# Patient Record
Sex: FEMALE | Race: BLACK OR AFRICAN AMERICAN | NOT HISPANIC OR LATINO | Employment: FULL TIME | URBAN - METROPOLITAN AREA
[De-identification: names, ages, dates, MRNs, and addresses within clinical notes are randomized per-mention and may not be internally consistent; named-entity substitution may affect disease eponyms.]

---

## 2020-07-23 ENCOUNTER — OFFICE VISIT (OUTPATIENT)
Dept: INTERNAL MEDICINE CLINIC | Facility: CLINIC | Age: 35
End: 2020-07-23

## 2020-07-23 VITALS
WEIGHT: 195.77 LBS | DIASTOLIC BLOOD PRESSURE: 84 MMHG | HEIGHT: 64 IN | HEART RATE: 76 BPM | OXYGEN SATURATION: 98 % | TEMPERATURE: 97.3 F | BODY MASS INDEX: 33.42 KG/M2 | SYSTOLIC BLOOD PRESSURE: 140 MMHG

## 2020-07-23 DIAGNOSIS — Z01.419 ROUTINE GYNECOLOGICAL EXAMINATION: ICD-10-CM

## 2020-07-23 DIAGNOSIS — E66.9 OBESITY (BMI 30.0-34.9): ICD-10-CM

## 2020-07-23 DIAGNOSIS — Z13.0 SCREENING FOR DEFICIENCY ANEMIA: ICD-10-CM

## 2020-07-23 DIAGNOSIS — Z11.4 SCREENING FOR HIV (HUMAN IMMUNODEFICIENCY VIRUS): ICD-10-CM

## 2020-07-23 DIAGNOSIS — Z13.1 SCREENING FOR DIABETES MELLITUS: ICD-10-CM

## 2020-07-23 DIAGNOSIS — Z13.220 SCREENING, LIPID: ICD-10-CM

## 2020-07-23 DIAGNOSIS — L63.9 ALOPECIA AREATA: ICD-10-CM

## 2020-07-23 DIAGNOSIS — R03.0 ELEVATED BP WITHOUT DIAGNOSIS OF HYPERTENSION: ICD-10-CM

## 2020-07-23 DIAGNOSIS — N89.8 VAGINAL ODOR: ICD-10-CM

## 2020-07-23 DIAGNOSIS — Z00.00 ROUTINE ADULT HEALTH MAINTENANCE: Primary | ICD-10-CM

## 2020-07-23 PROBLEM — E66.811 OBESITY (BMI 30.0-34.9): Status: ACTIVE | Noted: 2020-07-23

## 2020-07-23 PROCEDURE — 99385 PREV VISIT NEW AGE 18-39: CPT | Performed by: PHYSICIAN ASSISTANT

## 2020-07-23 NOTE — PROGRESS NOTES
Assessment/Plan:      Diagnoses and all orders for this visit:    Routine adult health maintenance    Obesity (BMI 30 0-34 9)  -     CBC and differential; Future  -     Comprehensive metabolic panel; Future  -     Lipid panel; Future  -     TSH, 3rd generation with Free T4 reflex; Future    Alopecia areata  -     Ambulatory referral to Dermatology; Future  -     CBC and differential; Future  -     TSH, 3rd generation with Free T4 reflex; Future  -     SHUBHAM Screen w/ Reflex to Titer/Pattern; Future  -     RF Screen w/ Reflex to Titer; Future    Vaginal odor    Elevated BP without diagnosis of hypertension    Routine gynecological examination  -     Ambulatory referral to Obstetrics / Gynecology; Future    Screening for HIV (human immunodeficiency virus)  -     HIV 1/2 Antigen/Antibody (4th Generation) w Reflex [facility]; Future    Screening for deficiency anemia  -     CBC and differential; Future    Screening for diabetes mellitus  -     Comprehensive metabolic panel; Future    Screening, lipid  -     Lipid panel; Future      patient is a very pleasant 78-year-old female presenting today to establish with our office and for annual physical     She overall states that she is generally healthy and denies any pre-existing medical conditions or any significant past medical history  Concerns addressed today:    BMI/obesity:  BMI currently 33 6  Patient states that next week she will start a strict diet plan which was reviewed with patient in detail  Patient was advised low carb and low-fat/low calorie foods, small portions as well as avoiding sugary drinks and fast food  She was encouraged to more fruits and vegetables as well as lean protein  Exercise regimen was also discussed with patient and I emphasized importance of short bursts of high intensity full body training, patient can refer to you to or download applications on her phone as well as consider home weight training and cardio as tolerated    I motivated patient to lose 10 lb before next checkup in 4 months  Alopecia areata:  Patient has noted a bald spot on the top of her head for which she is very self-conscious for at least the past 10 years  There does not appear to be any hair breakage in she denies any possible here damage from heat, chemicals or coloring  She states that she wears a wig on a daily basis to cover the spot and her boyfriend of 10 years has not even seen her without a weight on  Will check basic autoimmune blood work to rule out underlying autoimmune condition  Will refer to dermatology to further assist with treatment options  Patient also noted a vaginal order for the past few weeks without any obvious abnormal discharge or vaginal or urinary symptoms  Patient will need to see gyn for this  She will also need updated Pap testing and possible STD screening for GC and chlamydia  Referral provided  Patient has mildly elevated blood pressure of 140/84  Patient states she checks her BP occasionally at work and states it was always okay  Will need to keep an eye on this and certainly weight loss will help  Discussed importance of limiting salt and caffeine in her diet  Once again referrals provided for dermatology in gyn  Routine blood work ordered including HIV screen, CBC and CMP, lipid, TSH as well as SHUBHAM and rheumatoid factor  We will call her with these results  Age-appropriate education regarding screenings and prevention were addressed with patient as above  She declines Tdap today but states she may be interested at next visit  Will ask her again when she returns for follow-up in 4 months for weight check and BP recheck  Chief Complaint   Patient presents with   [facility] Establish Care     Routine labs with std       Chief Complaint   Patient presents with   [facility] Establish Care     Routine labs with std       Subjective:     Patient ID: Mohinder Berry is a 38 y o  female      34y/o female here today for annual physical and establishment with our office  Pt reports she has been fairly healthy, does not know of any pertinent medical hx  She denies any current medical conditions, does not take medication for anything  Denies any recent illnesses or hospitalizations  States she had sexual intercourse a month ago and started with a vaginal odor and that's not normal for her  States noticing off and on now for past month  States hesitant to have sex because of the odor  Intermittent discharge when she wipes herself  - creamy white/clear  Denies vaginal pain or itching, denies pain with intercourse  Denies discolored or thick clumpy discharge  Denies urinary sxs  She also notes a bald spot on top of head and wants to see dermatologist  States she is very embarrassed by it and wears a wig most of the time, has dated her boyfriend now for 10 years and states he has never seen her without her wig  Area is not itchy or painful  States tried an African American brand oil and states it has helped to fill in other areas but spot on top of head remains completely without hair  She is not aware of any personal or FHx of autoimmune conditions or thyroid conditions  Last dental cleaning: approx   Last eye exam: no correction  She states she does have portion control but is going to start a diet next week  States she will cut out red meats, eat more chicken, limit fried foods  She does eat fruits and vegetables daily  She will try to exercise every day for 1 hour with weights and walking  She does not take any vitamins  Denies ETOH, tobacco or use of drugs  She is currently in monogamous relationship with boyfriend x 10 years    She does not have a GYN, last PAP testing unknown  Periods are regular monthly  Last about 5 days, heavy bleeding first 2-3 days with clots, then lightens up  She works as HR  full time  Rents apartment, smoke alarms and CO detectors UTD  Wears seatbelt in car   Lives with daughter, feels safe at home and in relationship  Patient reports her mother is healthy and goes to the doctor on a regular basis  She reports diabetes in her grandfather and lung cancer in her grandmother who was a smoker  Review of Systems   Constitutional: Negative  HENT: Negative  Eyes: Negative  Respiratory: Negative  Cardiovascular: Negative  Gastrointestinal: Negative  Genitourinary:        As in HPI   Skin:        As in HPI   Neurological: Negative  Psychiatric/Behavioral: Negative  The following portions of the patient's history were reviewed and updated as appropriate: allergies, current medications, past family history, past medical history, past social history, past surgical history and problem list       Objective:     Physical Exam   Constitutional: She is oriented to person, place, and time  She appears well-developed  No distress  Obesity BMI 33 60   HENT:   Head: Normocephalic  Right Ear: Hearing, tympanic membrane and ear canal normal    Left Ear: Hearing, tympanic membrane and ear canal normal    Nose: Nose normal    Mouth/Throat: Oropharynx is clear and moist and mucous membranes are normal  Normal dentition  Eyes: Pupils are equal, round, and reactive to light  Conjunctivae, EOM and lids are normal    Neck: Normal range of motion and phonation normal  Neck supple  Normal carotid pulses present  Carotid bruit is not present  No thyroid mass present  Cardiovascular: Normal rate, regular rhythm and normal heart sounds  Pulses:       Carotid pulses are 2+ on the right side, and 2+ on the left side  Radial pulses are 2+ on the right side, and 2+ on the left side  Posterior tibial pulses are 2+ on the right side, and 2+ on the left side  Pulmonary/Chest: Effort normal and breath sounds normal    Abdominal: Soft  Bowel sounds are normal  There is no tenderness     Obese abdomen   Genitourinary:   Genitourinary Comments: Pt needs PAP, vaginosis/vaginitis swab and GC/chlamydoa screening - deferred to GYN  Musculoskeletal:   Gross normal appearance and movement of extremities and spine without obvious pain or limitation   Lymphadenopathy:        Head (right side): No submandibular and no tonsillar adenopathy present  Head (left side): No submandibular and no tonsillar adenopathy present  Neurological: She is alert and oriented to person, place, and time  Skin: Skin is intact  Alopecia areata noted top of head, with clearing of at least 2 inches in diameter, though difficult to fully assess as pt has hair in simon over the affected area  There does not seem to be hair breakage within this area  There is no redness or rash  Psychiatric: She has a normal mood and affect  Her speech is normal and behavior is normal    Vitals reviewed  Vitals:    07/23/20 0909   BP: 140/84   BP Location: Left arm   Patient Position: Sitting   Cuff Size: Standard   Pulse: 76   Temp: (!) 97 3 °F (36 3 °C)   TempSrc: Temporal   SpO2: 98%   Weight: 88 8 kg (195 lb 12 3 oz)   Height: 5' 4" (1 626 m)       BMI Counseling: Body mass index is 33 6 kg/m²  The BMI is above normal  Nutrition recommendations include reducing portion sizes, decreasing overall calorie intake, 3-5 servings of fruits/vegetables daily, consuming healthier snacks, moderation in carbohydrate intake, increasing intake of lean protein, reducing intake of saturated fat and trans fat and reducing intake of cholesterol  Exercise recommendations include moderate aerobic physical activity for 150 minutes/week       PHQ-9 Depression Screening    PHQ-9:    Frequency of the following problems over the past two weeks:       Little interest or pleasure in doing things:  0 - not at all  Feeling down, depressed, or hopeless:  0 - not at all  PHQ-2 Score:  0

## 2020-08-07 ENCOUNTER — OFFICE VISIT (OUTPATIENT)
Dept: OBGYN CLINIC | Facility: CLINIC | Age: 35
End: 2020-08-07
Payer: COMMERCIAL

## 2020-08-07 VITALS
BODY MASS INDEX: 33.63 KG/M2 | TEMPERATURE: 98.2 F | DIASTOLIC BLOOD PRESSURE: 74 MMHG | WEIGHT: 197 LBS | SYSTOLIC BLOOD PRESSURE: 112 MMHG | HEIGHT: 64 IN

## 2020-08-07 DIAGNOSIS — Z01.411 ENCOUNTER FOR GYNECOLOGICAL EXAMINATION WITH ABNORMAL FINDING: Primary | ICD-10-CM

## 2020-08-07 DIAGNOSIS — Z11.3 SCREEN FOR STD (SEXUALLY TRANSMITTED DISEASE): ICD-10-CM

## 2020-08-07 DIAGNOSIS — N89.8 VAGINAL ODOR: ICD-10-CM

## 2020-08-07 PROCEDURE — 3008F BODY MASS INDEX DOCD: CPT | Performed by: PHYSICIAN ASSISTANT

## 2020-08-07 PROCEDURE — 1036F TOBACCO NON-USER: CPT | Performed by: PHYSICIAN ASSISTANT

## 2020-08-07 PROCEDURE — 99385 PREV VISIT NEW AGE 18-39: CPT | Performed by: PHYSICIAN ASSISTANT

## 2020-08-07 NOTE — PATIENT INSTRUCTIONS
Go for blood work  Start monthly self-breast exam   Use hypoallergenic body wash and laundry detergent  Do not sit for prolonged periods in wet or sweaty clothing  Avoid douches

## 2020-08-07 NOTE — PROGRESS NOTES
Assessment/Plan:    No problem-specific Assessment & Plan notes found for this encounter  Diagnoses and all orders for this visit:    Encounter for gynecological examination with abnormal finding  -     Thinprep Pap and HR HPV DNA    Screen for STD (sexually transmitted disease)  -     RPR; Future  -     Hepatitis panel, acute; Future  -     Sureswab(R), Vaginosis/Vaginitis Plus    Vaginal odor  -     Sureswab(R), Vaginosis/Vaginitis Plus        Pap and vaginal cultures done  Orders entered for syphilis and hepatitis screening; HIV order already in Epic  We will call with results  Suspect BV infection  Recommended patient to use hypoallergenic body wash and laundry detergent  Do not sit for prolonged periods in wet or sweaty clothing  Avoid douches  Educated patient on proper timing and demonstrated proper technique for self-breast exam   If no problems, patient to return in 1 year for routine gyn care  Subjective:      Patient ID: Paula Meyers is a 38 y o  female  Patient is a  here for yearly gyn exam   She is new to our office today  It has been several years since her last gyn visit  States she is doing well overall  Periods are regular once a month, and bleeding lasts for 4-7 days  She states the first day is heavy; denies cramping  Has some bloating with her cycle  Complains of vaginal odor for the last several months  Possible increase in discharge  Denies itching and burning  Patient is sexually active with a monogamous partner; requests STD screening today  Does not desire contraception at this time  She denies bowel/bladder changes, pelvic pain, abdominal pain, n/v, change in appetite, and thyroid disease  No history of abnormal Paps or HPV  Patient does not know how to perform self-breast exam   Denies new masses, skin changes, nipple discharge, and pain/tenderness        The following portions of the patient's history were reviewed and updated as appropriate: allergies, current medications, past family history, past medical history, past social history, past surgical history and problem list     Review of Systems   Constitutional: Negative for appetite change and unexpected weight change  Cardiovascular:        No masses, skin changes, nipple discharge, and pain/tenderness  Gastrointestinal: Negative for abdominal distention, abdominal pain, constipation, diarrhea, nausea and vomiting  Genitourinary: Positive for vaginal discharge (With odor)  Negative for difficulty urinating, dysuria, frequency, genital sores, hematuria, menstrual problem, pelvic pain, urgency, vaginal bleeding and vaginal pain  Objective:      /74   Temp 98 2 °F (36 8 °C)   Ht 5' 4" (1 626 m)   Wt 89 4 kg (197 lb)   LMP 07/29/2020 (Exact Date)   BMI 33 81 kg/m²          Physical Exam   Constitutional: She is oriented to person, place, and time  She appears well-developed  She is cooperative  Neck: Neck supple  No thyromegaly present  Pulmonary/Chest: Effort normal  Right breast exhibits no inverted nipple, no mass, no nipple discharge, no skin change and no tenderness  Left breast exhibits no inverted nipple, no mass, no nipple discharge, no skin change and no tenderness  Breasts are symmetrical    Abdominal: Soft  Normal appearance  She exhibits no distension  There is no abdominal tenderness  Genitourinary:    Vulva, cervix, right adnexa and left adnexa normal    There is no rash, tenderness, lesion or injury on the right labia  There is no rash, tenderness, lesion or injury on the left labia  Right adnexum displays no mass, no tenderness and no fullness  Left adnexum displays no mass, no tenderness and no fullness  Vaginal discharge (Moderate, white, thick) present  No vaginal erythema, tenderness or bleeding  No erythema, tenderness or bleeding in the vagina  Lymphadenopathy:     She has no cervical adenopathy   No inguinal adenopathy noted on the right or left side  Right: No supraclavicular adenopathy present  Left: No supraclavicular adenopathy present  Neurological: She is alert and oriented to person, place, and time  Skin: Skin is warm and dry  Psychiatric: Her behavior is normal  Mood, judgment and thought content normal    Vitals reviewed

## 2020-08-11 LAB
CLINICAL INFO: ABNORMAL
CYTO CVX: ABNORMAL
DATE PREVIOUS BX: ABNORMAL
GEN CATEG CVX/VAG CYTO-IMP: ABNORMAL
HPV I/H RISK 1 DNA CVX QL PROBE+SIG AMP: NOT DETECTED
LMP START DATE: ABNORMAL
SL AMB PREV. PAP:: ABNORMAL
SPECIMEN SOURCE CVX/VAG CYTO: ABNORMAL

## 2020-08-14 LAB
A VAGINAE DNA VAG NAA+PROBE-LOG#: 7.5 LOG (CELLS/ML)
C GLABRATA DNA VAG QL NAA+PROBE: NOT DETECTED
C TRACH RRNA SPEC QL NAA+PROBE: NOT DETECTED
CANDIDA DNA VAG QL NAA+PROBE: NOT DETECTED
G VAGINALIS DNA VAG NAA+PROBE-LOG#: >8 LOG (CELLS/ML)
LACTOBACILLUS DNA VAG NAA+PROBE-LOG#: NOT DETECTED LOG CELLS/ML
MEGASPHAERA SP DNA VAG NAA+PROBE-LOG#: >8 LOG (CELLS/ML)
N GONORRHOEA RRNA SPEC QL NAA+PROBE: NOT DETECTED
SL AMB BV CATEGORY:: ABNORMAL
SL AMB C. PARAPSILOSIS, DNA: NOT DETECTED
SL AMB C. TROPICALIS, DNA: NOT DETECTED
T VAGINALIS RRNA SPEC QL NAA+PROBE: NOT DETECTED

## 2020-08-18 ENCOUNTER — TELEPHONE (OUTPATIENT)
Dept: OBGYN CLINIC | Facility: CLINIC | Age: 35
End: 2020-08-18

## 2020-08-18 DIAGNOSIS — B96.89 BV (BACTERIAL VAGINOSIS): Primary | ICD-10-CM

## 2020-08-18 DIAGNOSIS — N76.0 BV (BACTERIAL VAGINOSIS): Primary | ICD-10-CM

## 2020-08-18 RX ORDER — METRONIDAZOLE 500 MG/1
500 TABLET ORAL EVERY 12 HOURS SCHEDULED
Qty: 10 TABLET | Refills: 0 | Status: SHIPPED | OUTPATIENT
Start: 2020-08-18 | End: 2020-08-23

## 2020-08-18 NOTE — TELEPHONE ENCOUNTER
Spoke with patient regarding Pap and culture results  Pap was ASCUS with negative HPV  GC/chlamydia negative; positive for BV  Suspect chronic BV could be causing abnormal cells  Rx for metronidazole 500 mg BID x 5 days sent to pharmacy  Instructed patient to avoid alcohol while on abx  Call if symptoms do not resolve  F/u in 1 year for repeat Pap

## 2020-08-25 ENCOUNTER — TELEPHONE (OUTPATIENT)
Dept: OBGYN CLINIC | Facility: CLINIC | Age: 35
End: 2020-08-25

## 2020-08-25 NOTE — TELEPHONE ENCOUNTER
Patient called with further questions regarding Pap - ASCUS with negative HPV  Result most likely due to concurrent BV infection  Patient picked up abx today  Counseled patient to avoid alcohol while on abx  Call with further problems

## 2020-09-15 ENCOUNTER — OFFICE VISIT (OUTPATIENT)
Dept: OBGYN CLINIC | Facility: CLINIC | Age: 35
End: 2020-09-15
Payer: COMMERCIAL

## 2020-09-15 VITALS
HEIGHT: 64 IN | DIASTOLIC BLOOD PRESSURE: 84 MMHG | SYSTOLIC BLOOD PRESSURE: 120 MMHG | WEIGHT: 199.4 LBS | BODY MASS INDEX: 34.04 KG/M2 | TEMPERATURE: 99.2 F

## 2020-09-15 DIAGNOSIS — N76.0 ACUTE VAGINITIS: Primary | ICD-10-CM

## 2020-09-15 PROCEDURE — 1036F TOBACCO NON-USER: CPT | Performed by: PHYSICIAN ASSISTANT

## 2020-09-15 PROCEDURE — 99214 OFFICE O/P EST MOD 30 MIN: CPT | Performed by: PHYSICIAN ASSISTANT

## 2020-09-15 NOTE — PROGRESS NOTES
Assessment/Plan:    No problem-specific Assessment & Plan notes found for this encounter  Diagnoses and all orders for this visit:    Acute vaginitis  -     NuSwab Vaginitis (VG)        Vaginal cultures done; we will call with results  Suspect yeast infection  Recommended patient to start OTC Monistat vaginal cream 7 day for treatment  Discussed possible causes of yeast   Patient to call if symptoms worsen or change  Subjective:      Patient ID: Franc Rose is a 38 y o  female  Patient is here with complaint of probable vaginal infection  Seen with her boyfriend present  States symptoms started on 9/13 with vulvar swelling and irritation  She continues to have vulvovaginal discomfort, itching, and discharge  Patient denies urinary symptoms, odor, vaginal bleeding, pelvic pain, abdominal pain, n/v, and fever/chills  She is sexually active with a monogamous partner  She was treated at the end of August for BV; finished full course of abx  GC/chlamydia screening on 8/7 was negative  The following portions of the patient's history were reviewed and updated as appropriate: allergies, current medications, past family history, past medical history, past social history, past surgical history and problem list     Review of Systems   Constitutional: Negative for chills and fever  Gastrointestinal: Negative for abdominal distention, abdominal pain, nausea and vomiting  Genitourinary: Positive for vaginal discharge (With itching)  Negative for difficulty urinating, dysuria, frequency, genital sores, hematuria, menstrual problem, pelvic pain, urgency, vaginal bleeding and vaginal pain  Vulvovaginal discomfort  Objective:      /84   Temp 99 2 °F (37 3 °C)   Ht 5' 4" (1 626 m)   Wt 90 4 kg (199 lb 6 4 oz)   LMP 09/02/2020   BMI 34 23 kg/m²          Physical Exam  Vitals signs reviewed  Exam conducted with a chaperone present     Constitutional:       Appearance: Normal appearance  She is well-developed  Genitourinary:     General: Normal vulva  Pubic Area: No rash  Labia:         Right: No rash, tenderness, lesion or injury  Left: No rash, tenderness, lesion or injury  Vagina: Vaginal discharge (Moderate, white, curd-like), erythema and tenderness present  No bleeding  Cervix: Normal       Uterus: Normal        Adnexa: Right adnexa normal and left adnexa normal         Right: No mass, tenderness or fullness  Left: No mass, tenderness or fullness  Lymphadenopathy:      Lower Body: No right inguinal adenopathy  No left inguinal adenopathy  Skin:     General: Skin is warm and dry  Neurological:      Mental Status: She is alert and oriented to person, place, and time  Psychiatric:         Mood and Affect: Mood normal          Behavior: Behavior normal  Behavior is cooperative  Thought Content:  Thought content normal          Judgment: Judgment normal

## 2020-09-18 ENCOUNTER — TELEPHONE (OUTPATIENT)
Dept: OBGYN CLINIC | Facility: CLINIC | Age: 35
End: 2020-09-18

## 2020-09-18 LAB
A VAGINAE DNA VAG QL NAA+PROBE: ABNORMAL SCORE
BVAB2 DNA VAG QL NAA+PROBE: ABNORMAL SCORE
C ALBICANS DNA VAG QL NAA+PROBE: POSITIVE
C GLABRATA DNA VAG QL NAA+PROBE: NEGATIVE
MEGA1 DNA VAG QL NAA+PROBE: ABNORMAL SCORE
T VAGINALIS RRNA SPEC QL NAA+PROBE: NEGATIVE

## 2020-09-18 NOTE — TELEPHONE ENCOUNTER
Spoke with patient regarding vaginal culture results - positive for yeast   Patient to use OTC Monistat 7 day vaginal cream   Call if symptoms do not resolve

## 2020-11-23 ENCOUNTER — TELEPHONE (OUTPATIENT)
Dept: INTERNAL MEDICINE CLINIC | Facility: CLINIC | Age: 35
End: 2020-11-23

## 2020-12-11 ENCOUNTER — TELEPHONE (OUTPATIENT)
Dept: OBGYN CLINIC | Facility: CLINIC | Age: 35
End: 2020-12-11

## 2020-12-11 DIAGNOSIS — N89.8 VAGINAL ODOR: Primary | ICD-10-CM

## 2020-12-11 RX ORDER — METRONIDAZOLE 500 MG/1
500 TABLET ORAL EVERY 12 HOURS SCHEDULED
Qty: 10 TABLET | Refills: 0 | Status: SHIPPED | OUTPATIENT
Start: 2020-12-11 | End: 2020-12-16

## 2021-02-04 ENCOUNTER — TELEPHONE (OUTPATIENT)
Dept: OBGYN CLINIC | Facility: CLINIC | Age: 36
End: 2021-02-04

## 2021-02-05 ENCOUNTER — OFFICE VISIT (OUTPATIENT)
Dept: OBGYN CLINIC | Facility: CLINIC | Age: 36
End: 2021-02-05

## 2021-02-05 VITALS — WEIGHT: 207 LBS | DIASTOLIC BLOOD PRESSURE: 84 MMHG | SYSTOLIC BLOOD PRESSURE: 122 MMHG | BODY MASS INDEX: 35.53 KG/M2

## 2021-02-05 DIAGNOSIS — N89.8 VAGINAL ODOR: Primary | ICD-10-CM

## 2021-02-05 PROCEDURE — 87070 CULTURE OTHR SPECIMN AEROBIC: CPT | Performed by: PHYSICIAN ASSISTANT

## 2021-02-05 PROCEDURE — 87147 CULTURE TYPE IMMUNOLOGIC: CPT | Performed by: PHYSICIAN ASSISTANT

## 2021-02-05 PROCEDURE — 87077 CULTURE AEROBIC IDENTIFY: CPT | Performed by: PHYSICIAN ASSISTANT

## 2021-02-05 PROCEDURE — 99214 OFFICE O/P EST MOD 30 MIN: CPT | Performed by: PHYSICIAN ASSISTANT

## 2021-02-05 RX ORDER — CLINDAMYCIN PHOSPHATE 20 MG/G
1 CREAM VAGINAL
Qty: 40 G | Refills: 0 | Status: SHIPPED | OUTPATIENT
Start: 2021-02-05 | End: 2021-02-12

## 2021-02-05 NOTE — PROGRESS NOTES
Assessment/Plan:    No problem-specific Assessment & Plan notes found for this encounter  Diagnoses and all orders for this visit:    Vaginal odor  -     clindamycin (CLEOCIN) 2 % vaginal cream; Insert 1 applicator into the vagina daily at bedtime for 7 days  -     Genital Comprehensive Culture        Vaginal culture done  We will call with results  Rx for Cleocin 2% vaginal cream daily x 7 days sent to pharmacy  Patient can try daily probiotic  Make sure to use hypoallergenic soap and laundry detergent  Call if symptoms worsen or change  Subjective:      Patient ID: Sallie Hadley is a 38 y o  female  Patient is here with complaint of recurrent vaginal infection  Was diagnosed with BV in August and treated with metronidazole  Then developed a yeast infection  Felt she had another BV infection in mid-December; treated again with metronidazole  Today, she complains of vaginal odor for the last 2 weeks  Patient denies urinary symptoms, discharge, itching, burning, pelvic pain, abdominal pain, n/v, and fever/chills  No new sexual partners  Does not want treated with oral antibiotic as they cause her yeast infections  The following portions of the patient's history were reviewed and updated as appropriate: allergies, current medications, past family history, past medical history, past social history, past surgical history and problem list     Review of Systems   Constitutional: Negative for chills and fever  Gastrointestinal: Negative for abdominal distention, abdominal pain, nausea and vomiting  Genitourinary: Negative for difficulty urinating, dysuria, frequency, genital sores, hematuria, menstrual problem, pelvic pain, urgency, vaginal bleeding, vaginal discharge and vaginal pain  Vaginal odor         Objective:      /84   Wt 93 9 kg (207 lb)   BMI 35 53 kg/m²          Physical Exam  Vitals signs reviewed  Exam conducted with a chaperone present     Constitutional: Appearance: Normal appearance  She is well-developed  Genitourinary:     General: Normal vulva  Pubic Area: No rash  Labia:         Right: No rash, tenderness, lesion or injury  Left: No rash, tenderness, lesion or injury  Vagina: Normal  No vaginal discharge, erythema, tenderness or bleeding  Cervix: Normal       Uterus: Normal        Adnexa: Right adnexa normal and left adnexa normal         Right: No mass, tenderness or fullness  Left: No mass, tenderness or fullness  Lymphadenopathy:      Lower Body: No right inguinal adenopathy  No left inguinal adenopathy  Skin:     General: Skin is warm and dry  Neurological:      Mental Status: She is alert and oriented to person, place, and time  Psychiatric:         Mood and Affect: Mood normal          Behavior: Behavior normal  Behavior is cooperative  Thought Content:  Thought content normal          Judgment: Judgment normal

## 2021-02-08 LAB
BACTERIA GENITAL AEROBE CULT: ABNORMAL

## 2021-02-09 ENCOUNTER — TELEPHONE (OUTPATIENT)
Dept: OBGYN CLINIC | Facility: CLINIC | Age: 36
End: 2021-02-09

## 2021-02-09 NOTE — TELEPHONE ENCOUNTER
Spoke with patient regarding vaginal culture results - positive for BV  Patient to finish full 7 days of clindamycin cream   Call if symptoms do not resolve

## 2021-06-08 DIAGNOSIS — B96.89 BV (BACTERIAL VAGINOSIS): Primary | ICD-10-CM

## 2021-06-08 DIAGNOSIS — N76.0 BV (BACTERIAL VAGINOSIS): Primary | ICD-10-CM

## 2021-06-08 RX ORDER — CLINDAMYCIN PHOSPHATE 20 MG/G
1 CREAM VAGINAL
Qty: 40 G | Refills: 0 | Status: SHIPPED | OUTPATIENT
Start: 2021-06-08 | End: 2021-06-15

## 2021-07-30 ENCOUNTER — TELEPHONE (OUTPATIENT)
Dept: OBGYN CLINIC | Facility: CLINIC | Age: 36
End: 2021-07-30

## 2021-07-30 NOTE — TELEPHONE ENCOUNTER
Patient requesting refill of medication for BV, also has questions as to why she is having recurrence monthly of same symptoms of vaginal infection

## 2021-08-06 ENCOUNTER — OFFICE VISIT (OUTPATIENT)
Dept: OBGYN CLINIC | Facility: CLINIC | Age: 36
End: 2021-08-06

## 2021-08-06 VITALS
DIASTOLIC BLOOD PRESSURE: 82 MMHG | WEIGHT: 199.6 LBS | BODY MASS INDEX: 34.08 KG/M2 | SYSTOLIC BLOOD PRESSURE: 120 MMHG | HEIGHT: 64 IN

## 2021-08-06 DIAGNOSIS — N76.0 RECURRENT VAGINITIS: Primary | ICD-10-CM

## 2021-08-06 PROCEDURE — 99214 OFFICE O/P EST MOD 30 MIN: CPT | Performed by: PHYSICIAN ASSISTANT

## 2021-08-06 PROCEDURE — 87660 TRICHOMONAS VAGIN DIR PROBE: CPT | Performed by: PHYSICIAN ASSISTANT

## 2021-08-06 PROCEDURE — 87510 GARDNER VAG DNA DIR PROBE: CPT | Performed by: PHYSICIAN ASSISTANT

## 2021-08-06 PROCEDURE — 87480 CANDIDA DNA DIR PROBE: CPT | Performed by: PHYSICIAN ASSISTANT

## 2021-08-06 RX ORDER — CLINDAMYCIN PHOSPHATE 20 MG/G
1 CREAM VAGINAL
Qty: 40 G | Refills: 0 | Status: SHIPPED | OUTPATIENT
Start: 2021-08-06 | End: 2021-08-13

## 2021-08-06 NOTE — PROGRESS NOTES
Assessment/Plan:    No problem-specific Assessment & Plan notes found for this encounter  Diagnoses and all orders for this visit:    Recurrent vaginitis  -     VAGINOSIS DNA PROBE (AFFIRM)  -     clindamycin (CLEOCIN) 2 % vaginal cream; Insert 1 applicator into the vagina daily at bedtime for 7 days        Vaginal cultures done  We will call with results  Rx for Cleocin 2% vaginal cream daily x 7 days sent to pharmacy  Call if symptoms worsen or change  Recommended switching to unscented laundry detergent and dryer sheets  F/u in 2-3 weeks for yearly gyn exam     Subjective:      Patient ID: Paulo Sahni is a 43 y o  female  Patient is here with complaint of recurrent BV  Was last treated in February 2021  States she started having odor about a week ago  Denies urinary symptoms, vaginal discharge, itching, burning, pelvic pain, abdominal pain, n/v, and fever/chills  She is sexually active with a monogamous partner; they do not use condoms  Uses Dove soap and Gain scented laundry detergent and dryer sheets  Does not use vaginal douches or washes  The following portions of the patient's history were reviewed and updated as appropriate: allergies, current medications, past family history, past medical history, past social history, past surgical history and problem list     Review of Systems   Constitutional: Negative for chills and fever  Gastrointestinal: Negative for abdominal distention, abdominal pain, nausea and vomiting  Genitourinary: Negative for difficulty urinating, dysuria, frequency, genital sores, hematuria, menstrual problem, pelvic pain, urgency, vaginal bleeding, vaginal discharge and vaginal pain  Vaginal odor         Objective:      /82 (BP Location: Left arm, Patient Position: Sitting, Cuff Size: Standard)   Ht 5' 4" (1 626 m)   Wt 90 5 kg (199 lb 9 6 oz)   LMP 08/01/2021   BMI 34 26 kg/m²          Physical Exam  Vitals reviewed   Exam conducted with a chaperone present  Constitutional:       Appearance: Normal appearance  She is well-developed  Genitourinary:     General: Normal vulva  Pubic Area: No rash  Labia:         Right: No rash, tenderness, lesion or injury  Left: No rash, tenderness, lesion or injury  Vagina: Vaginal discharge (mild, clear, watery) present  No erythema, tenderness or bleeding  Cervix: Normal       Uterus: Normal        Adnexa: Right adnexa normal and left adnexa normal         Right: No mass, tenderness or fullness  Left: No mass, tenderness or fullness  Lymphadenopathy:      Lower Body: No right inguinal adenopathy  No left inguinal adenopathy  Skin:     General: Skin is warm and dry  Neurological:      Mental Status: She is alert and oriented to person, place, and time  Psychiatric:         Mood and Affect: Mood normal          Behavior: Behavior normal  Behavior is cooperative  Thought Content:  Thought content normal          Judgment: Judgment normal

## 2021-08-06 NOTE — PATIENT INSTRUCTIONS
Rx for Cleocin 2% vaginal cream daily x 7 days sent to pharmacy  Switch to unscented laundry detergent and dryer sheets  Call if symptoms worsen or change

## 2021-08-08 LAB
CANDIDA RRNA VAG QL PROBE: NEGATIVE
G VAGINALIS RRNA GENITAL QL PROBE: POSITIVE
T VAGINALIS RRNA GENITAL QL PROBE: NEGATIVE

## 2021-08-10 ENCOUNTER — TELEPHONE (OUTPATIENT)
Dept: OBGYN CLINIC | Facility: CLINIC | Age: 36
End: 2021-08-10

## 2021-08-10 NOTE — TELEPHONE ENCOUNTER
Patient aware of culture results per Dr Carl Cruz, was prescribed cleocin at [facility] w/ [facility]  Picked up from pharmacy today and will start  She still has questions as to why she continues to have recurrent infections monthly  Patient aware provider is out of the office this week

## 2021-08-16 ENCOUNTER — TELEPHONE (OUTPATIENT)
Dept: OBGYN CLINIC | Facility: CLINIC | Age: 36
End: 2021-08-16

## 2021-08-16 NOTE — TELEPHONE ENCOUNTER
Spoke with patient regarding results - positive for BV  Started Cleocin cream last week  Patient states she gets these infections after intercourse  Recommended avoiding anything scented - body wash, laundry detergent, dryer sheets, etc   Can try daily probiotic  Avoid tight clothing and spending prolonged periods in wet or sweaty clothes  Can refer to vaginitis clinic if it continues  Call with further problems

## 2021-10-26 ENCOUNTER — ANNUAL EXAM (OUTPATIENT)
Dept: OBGYN CLINIC | Facility: CLINIC | Age: 36
End: 2021-10-26

## 2021-10-26 VITALS
SYSTOLIC BLOOD PRESSURE: 122 MMHG | BODY MASS INDEX: 35.17 KG/M2 | HEIGHT: 64 IN | WEIGHT: 206 LBS | DIASTOLIC BLOOD PRESSURE: 80 MMHG

## 2021-10-26 DIAGNOSIS — Z01.419 ENCOUNTER FOR GYNECOLOGICAL EXAMINATION WITHOUT ABNORMAL FINDING: Primary | ICD-10-CM

## 2021-10-26 PROCEDURE — G0124 SCREEN C/V THIN LAYER BY MD: HCPCS | Performed by: PATHOLOGY

## 2021-10-26 PROCEDURE — G0476 HPV COMBO ASSAY CA SCREEN: HCPCS | Performed by: PHYSICIAN ASSISTANT

## 2021-10-26 PROCEDURE — G0145 SCR C/V CYTO,THINLAYER,RESCR: HCPCS | Performed by: PATHOLOGY

## 2021-10-26 PROCEDURE — 99395 PREV VISIT EST AGE 18-39: CPT | Performed by: PHYSICIAN ASSISTANT

## 2021-10-29 LAB
HPV HR 12 DNA CVX QL NAA+PROBE: NEGATIVE
HPV16 DNA CVX QL NAA+PROBE: NEGATIVE
HPV18 DNA CVX QL NAA+PROBE: NEGATIVE

## 2021-11-02 LAB
LAB AP GYN PRIMARY INTERPRETATION: ABNORMAL
Lab: ABNORMAL
PATH INTERP SPEC-IMP: ABNORMAL

## 2021-11-04 ENCOUNTER — TELEPHONE (OUTPATIENT)
Dept: OBGYN CLINIC | Facility: CLINIC | Age: 36
End: 2021-11-04

## 2023-02-03 ENCOUNTER — OFFICE VISIT (OUTPATIENT)
Dept: FAMILY MEDICINE CLINIC | Facility: CLINIC | Age: 38
End: 2023-02-03

## 2023-02-03 VITALS
RESPIRATION RATE: 18 BRPM | HEIGHT: 65 IN | SYSTOLIC BLOOD PRESSURE: 142 MMHG | OXYGEN SATURATION: 98 % | WEIGHT: 208 LBS | HEART RATE: 76 BPM | TEMPERATURE: 97.8 F | BODY MASS INDEX: 34.66 KG/M2 | DIASTOLIC BLOOD PRESSURE: 92 MMHG

## 2023-02-03 DIAGNOSIS — L63.9 ALOPECIA AREATA: Primary | ICD-10-CM

## 2023-02-03 DIAGNOSIS — R03.0 ELEVATED BLOOD PRESSURE READING: ICD-10-CM

## 2023-02-03 DIAGNOSIS — E66.9 OBESITY (BMI 30.0-34.9): ICD-10-CM

## 2023-02-03 PROBLEM — R87.610 ATYPICAL SQUAMOUS CELLS OF UNDETERMINED SIGNIFICANCE (ASCUS) ON PAPANICOLAOU SMEAR OF CERVIX: Status: ACTIVE | Noted: 2023-02-03

## 2023-02-03 NOTE — PROGRESS NOTES
Name: Enma Jessica      : 1985      MRN: [id number]  Encounter Provider: Doc Echols DO  Encounter Date: 2/3/2023   [place] [facility] [place]essment & Plan     1  Alopecia areata  Assessment & Plan:  - ongoing for several years with worsening sxs over the last several months  - has never tried any medications or seen anyone for this  - referral to derm for further eval/treatment    Orders:  -     Ambulatory Referral to Dermatology; Future    2  Elevated blood pressure reading  Assessment & Plan:  - /92, repeat 142/90  - no need for medication at this time  - encourage lifestyle modifications  - info regarding DASH diet provided       3  Obesity (BMI 30 0-34 9)  -     Lipid panel; Future  -     Hemoglobin A1C; Future  -     Basic metabolic panel; Future         Subjective      41 y/o F presents for new patient evaluation  Her main concern is her alopecia which has been on going for years  It was small but now the center of her head is completely bald  Does not think anyone has this in the family  She is very self conscious about this and wears a wig on a daily basis  BP elevated in office, no hx of HTN and denies FHx of HTN  No formal exercise but will working at [employer] on  [address] which allow her to get some physical activity  Cough and cold symptoms a couple weeks ago - still cough with mild mucus production that's improving  Last pap was 10/26/2021, ASCUS but HPV negative but pt instructed to follow up with her OB regarding this  Review of Systems   Constitutional: Negative for chills and fever  HENT: Positive for congestion  Negative for rhinorrhea  Eyes: Negative for visual disturbance  Respiratory: Positive for cough  Negative for shortness of breath  Cardiovascular: Negative for chest pain and palpitations     Gastrointestinal: Negative for abdominal pain, constipation, diarrhea, nausea and vomiting  Genitourinary: Negative for dysuria  Musculoskeletal: Negative for arthralgias  Skin: Negative for rash  Hair loss   Neurological: Negative for dizziness, light-headedness and headaches  No current outpatient medications on file prior to visit  Objective     /92   Pulse 76   Temp 97 8 °F (36 6 °C)   Resp 18   Ht 5' 4 8" (1 646 m)   Wt 94 3 kg (208 lb)   SpO2 98%   BMI 34 83 kg/m²     Physical Exam  Vitals reviewed  Constitutional:       General: She is not in acute distress  Appearance: Normal appearance  HENT:      Head: Normocephalic and atraumatic  Right Ear: External ear normal       Left Ear: External ear normal       Nose: Nose normal       Mouth/Throat:      Pharynx: Oropharynx is clear  Eyes:      Conjunctiva/sclera: Conjunctivae normal    Cardiovascular:      Rate and Rhythm: Normal rate and regular rhythm  Pulses: Normal pulses  Heart sounds: Normal heart sounds  Pulmonary:      Effort: Pulmonary effort is normal       Breath sounds: Normal breath sounds  Abdominal:      Palpations: Abdomen is soft  Musculoskeletal:      Cervical back: Neck supple  Right lower leg: No edema  Left lower leg: No edema  Skin:     General: Skin is warm  Capillary Refill: Capillary refill takes less than 2 seconds  Comments: Multiple patchy areas of hair loss on scalp - 2 inches in diameter but difficult to assess completely as patient has simon over area of hair loss  No erythema or rash present  Neurological:      Mental Status: She is alert and oriented to person, place, and time  Gait: Gait normal    Psychiatric:         Mood and Affect: Mood normal          Behavior: Behavior normal          Thought Content:  Thought content normal          Judgment: Judgment normal        Luz Guzman DO

## 2023-02-04 NOTE — ASSESSMENT & PLAN NOTE
- ongoing for several years with worsening sxs over the last several months  - has never tried any medications or seen anyone for this  - referral to derm for further eval/treatment

## 2023-02-04 NOTE — ASSESSMENT & PLAN NOTE
- per last pap smear 10/2021 - this was suspected to be related to her chronic BV infection but she was recommended to get f/u pap in 1 year at that time  - HPV was negative at that time  - pt does not recall these results - reviewed results again with pt and instructed her to f/u with her OBGYN for follow up test

## 2023-02-04 NOTE — ASSESSMENT & PLAN NOTE
- /92, repeat 142/90  - no need for medication at this time  - encourage lifestyle modifications  - info regarding DASH diet provided

## 2023-02-09 ENCOUNTER — ANNUAL EXAM (OUTPATIENT)
Dept: OBGYN CLINIC | Facility: CLINIC | Age: 38
End: 2023-02-09

## 2023-02-09 VITALS
BODY MASS INDEX: 34.32 KG/M2 | SYSTOLIC BLOOD PRESSURE: 126 MMHG | DIASTOLIC BLOOD PRESSURE: 72 MMHG | WEIGHT: 206 LBS | HEIGHT: 65 IN

## 2023-02-09 DIAGNOSIS — R87.610 ASCUS OF CERVIX WITH NEGATIVE HIGH RISK HPV: ICD-10-CM

## 2023-02-09 DIAGNOSIS — Z01.419 ENCOUNTER FOR GYNECOLOGICAL EXAMINATION WITHOUT ABNORMAL FINDING: Primary | ICD-10-CM

## 2023-02-09 DIAGNOSIS — Z12.31 ENCOUNTER FOR SCREENING MAMMOGRAM FOR MALIGNANT NEOPLASM OF BREAST: ICD-10-CM

## 2023-02-09 DIAGNOSIS — Z01.419 PAP SMEAR, AS PART OF ROUTINE GYNECOLOGICAL EXAMINATION: ICD-10-CM

## 2023-02-09 RX ORDER — AMOXICILLIN 500 MG/1
CAPSULE ORAL
COMMUNITY
Start: 2023-01-19

## 2023-02-09 RX ORDER — IBUPROFEN 800 MG/1
TABLET ORAL
COMMUNITY
Start: 2023-01-19

## 2023-02-09 NOTE — PROGRESS NOTES
Assessment/Plan:    No problem-specific Assessment & Plan notes found for this encounter  Diagnoses and all orders for this visit:    Encounter for gynecological examination without abnormal finding  -     Cancel: Liquid-based pap, screening  -     Liquid-based pap, screening    Pap smear, as part of routine gynecological examination    Encounter for screening mammogram for malignant neoplasm of breast  -     Mammo screening bilateral w 3d & cad; Future    ASCUS of cervix with negative high risk HPV    Other orders  -     amoxicillin (AMOXIL) 500 mg capsule; TAKE ONE CAPLET BY MOUTH THREE TIMES DAILY (Patient not taking: Reported on 2/9/2023)  -     ibuprofen (MOTRIN) 800 mg tablet; TAKE ONE TABLET BY MOUTH EVERY SIX HOURS AS NEEDED PAIN (Patient not taking: Reported on 2/9/2023)          Normal gynecological physical examination  Self-breast examination stressed  Mammogram ordered  Discussed regular exercise, healthy diet, importance of vitamin D and calcium supplements  Discussed importance of sun block use during periods of prolonged sun exposure  Patient will be seen in 1 year for routine gynecologic and medical examination  Patient will call office for any problems, concerns, or issues which may arise during the interim  Pap smear with HPV testing done today to follow-up on abnormal pap smear showing ASCUS with negative HPV from October 2021  Subjective:       Raz Painter is a 41-year-old female with no significant PMH presenting to the office today for an annual well visit  She has no specific complaints today  She had ASCUS with negative HPV on her last pap smear results, so we will repeat pap smear with HPV testing today  She gets occasional headaches for which she takes ibuprofen or acetaminophen  She denies fever, chills, chest pain, SOB, abdominal pain, pelvic pain       HPI    Patient ID: Raz Painter is a 42 y o  female who presents today for her annual gynecologic and medical examination    Menstrual status: Periods are regular  Patient denies abnormal discharge, pelvic pain, or itching  Vasomotor symptoms: none    Patient reports normal appetite    Patient reports normal bowel and bladder habits    Patient denies any significant pelvic or abdominal pain    Patient denies any headaches, chest pain, shortness of breath fever shakes or chills    Patient denies any COVID 19 symptoms including cough or loss of taste or smell    COVID vaccine status: No covid vaccinations    Medical problems: No significant PMH    Colonoscopy status: N/A    Mammogram status: Patient will schedule for her first mammogram this year after she turns 34years old  The following portions of the patient's history were reviewed and updated as appropriate: allergies, current medications, past family history, past medical history, past social history, past surgical history and problem list     Review of Systems   Constitutional: Negative  Negative for appetite change, diaphoresis, fatigue, fever and unexpected weight change  HENT: Negative  Eyes: Negative  Respiratory: Negative  Negative for shortness of breath  Cardiovascular: Negative  Negative for chest pain  Gastrointestinal: Negative  Negative for abdominal pain, blood in stool, constipation, diarrhea, nausea and vomiting  Endocrine: Negative  Negative for cold intolerance and heat intolerance  Genitourinary: Negative  Negative for dysuria, frequency, hematuria, urgency, vaginal bleeding, vaginal discharge and vaginal pain  Musculoskeletal: Negative  Skin: Negative  Allergic/Immunologic: Negative  Neurological: Negative  Hematological: Negative  Negative for adenopathy  Psychiatric/Behavioral: Negative            Objective:      /72   Ht 5' 4 8" (1 646 m)   Wt 93 4 kg (206 lb)   LMP 02/03/2023 (Exact Date)   BMI 34 49 kg/m²          Physical Exam  Constitutional:       General: She is not in acute distress  Appearance: Normal appearance  She is well-developed  She is not diaphoretic  HENT:      Head: Normocephalic and atraumatic  Eyes:      Pupils: Pupils are equal, round, and reactive to light  Cardiovascular:      Rate and Rhythm: Normal rate and regular rhythm  Heart sounds: Normal heart sounds  No murmur heard  No friction rub  No gallop  Pulmonary:      Effort: Pulmonary effort is normal       Breath sounds: Normal breath sounds  Chest:   Breasts:     Breasts are symmetrical       Right: No inverted nipple, mass, nipple discharge, skin change or tenderness  Left: No inverted nipple, mass, nipple discharge, skin change or tenderness  Abdominal:      General: Bowel sounds are normal       Palpations: Abdomen is soft  Genitourinary:     Exam position: Supine  Labia:         Right: No rash or lesion  Left: No rash or lesion  Urethra: No urethral swelling or urethral lesion  Vagina: Normal  No vaginal discharge, erythema, tenderness or bleeding  Cervix: No discharge or friability  Uterus: Normal  Not enlarged and not tender  Adnexa: Right adnexa normal and left adnexa normal         Right: No mass, tenderness or fullness  Left: No mass, tenderness or fullness  Rectum: Normal  Guaiac result negative  Comments: Good pelvic support  Musculoskeletal:         General: Normal range of motion  Cervical back: Normal range of motion and neck supple  Lymphadenopathy:      Cervical: No cervical adenopathy  Upper Body:      Right upper body: No supraclavicular adenopathy  Left upper body: No supraclavicular adenopathy  Skin:     General: Skin is warm and dry  Findings: No rash  Neurological:      General: No focal deficit present  Mental Status: She is alert and oriented to person, place, and time     Psychiatric:         Mood and Affect: Mood normal          Speech: Speech normal          Behavior: Behavior normal          Thought Content:  Thought content normal          Judgment: Judgment normal

## 2023-02-16 LAB
LAB AP GYN PRIMARY INTERPRETATION: NORMAL
Lab: NORMAL

## 2023-03-13 ENCOUNTER — TELEPHONE (OUTPATIENT)
Dept: OBGYN CLINIC | Facility: CLINIC | Age: 38
End: 2023-03-13

## 2023-03-13 NOTE — TELEPHONE ENCOUNTER
Please tell patient that since it has been a while since she was last treated, that perhaps we should do a culture just to make sure that she does not need anything else treated as well      Please tell her I will be glad to see her this afternoon    Thank

## 2023-03-13 NOTE — TELEPHONE ENCOUNTER
Pt  Is calling because she says she has a BV  She is requesting the (clindamycin) vaginal cream to be sent to [facility] in [address]  She says she should be able to get a prescription since she last got one from [facility]  I told the pt  I will send the message to the provider but that an appointment might be needed        [phone]

## 2023-03-14 ENCOUNTER — OFFICE VISIT (OUTPATIENT)
Dept: OBGYN CLINIC | Facility: CLINIC | Age: 38
End: 2023-03-14

## 2023-03-14 VITALS
BODY MASS INDEX: 35.02 KG/M2 | SYSTOLIC BLOOD PRESSURE: 126 MMHG | HEIGHT: 65 IN | WEIGHT: 210.2 LBS | DIASTOLIC BLOOD PRESSURE: 62 MMHG

## 2023-03-14 DIAGNOSIS — N89.8 VAGINAL ODOR: ICD-10-CM

## 2023-03-14 DIAGNOSIS — N89.8 VAGINAL DISCHARGE: ICD-10-CM

## 2023-03-14 DIAGNOSIS — N76.0 RECURRENT VAGINITIS: Primary | ICD-10-CM

## 2023-03-14 RX ORDER — CLINDAMYCIN PHOSPHATE 20 MG/G
1 CREAM VAGINAL
Qty: 40 G | Refills: 0 | Status: SHIPPED | OUTPATIENT
Start: 2023-03-14

## 2023-03-14 NOTE — PROGRESS NOTES
Assessment/Plan:    No problem-specific Assessment & Plan notes found for this encounter  Diagnoses and all orders for this visit:    Recurrent vaginitis  -     Molecular Vaginal Panel  -     clindamycin (CLEOCIN) 2 % vaginal cream; Insert 1 applicator into the vagina daily at bedtime    Vaginal odor    Vaginal discharge        Subjective:      Patient ID: Delfino Drew is a 42 y o  female  Patient presents for evaluation of vaginal discharge and odor  Reports the discharge is white and is sometimes thick, sometimes watery in consistency  Notes odor particularly after having sex  Feels similar to past BV infections which resolved after use of clindamycin cream  Denies any abnormal vaginal bleeding, fever, chills, N/V, bowel changes, dysuria, hematuria, or changes to urinary frequency  She is in a monogamous relationship with a male partner, reports having unprotected sex  Denies history of STIs  LMP on 3/2/23, periods remain regular with no changes to amount of bleeding/length of cycle  Total time of today's visit was 25 minutes of which greater than 50% was spent face-to-face counseling the patient as well as coordination of care, review of chart lab values, physical examination with cultures as well as computer entry into the [facility] medical record system  The following portions of the patient's history were reviewed and updated as appropriate: allergies, current medications, past family history, past medical history, past social history, past surgical history and problem list     Review of Systems   Constitutional: Negative for chills and fever  Gastrointestinal: Negative for abdominal pain, blood in stool, constipation, diarrhea, nausea and vomiting  Genitourinary: Positive for vaginal discharge  Negative for dysuria, frequency, hematuria, menstrual problem and vaginal bleeding           Objective:      /62   Ht 5' 4 8" (1 646 m)   Wt 95 3 kg (210 lb 3 2 oz)   LMP 03/02/2023 (Exact Date)   BMI 35 20 kg/m²          Physical Exam  Exam conducted with a chaperone present  Constitutional:       General: She is not in acute distress  Cardiovascular:      Rate and Rhythm: Normal rate  Pulmonary:      Effort: Pulmonary effort is normal  No respiratory distress  Genitourinary:     General: Normal vulva  Exam position: Lithotomy position  Labia:         Right: No rash, tenderness or lesion  Left: No rash, tenderness or lesion  Urethra: No urethral swelling or urethral lesion  Vagina: Vaginal discharge present  No erythema, bleeding or lesions  Cervix: No lesion or erythema  Uterus: Normal  Not enlarged and not tender  Adnexa: Right adnexa normal and left adnexa normal         Right: No mass, tenderness or fullness  Left: No mass, tenderness or fullness  Comments: Vaginal discharge noted, appropriate culture obtained  Skin:     General: Skin is warm and dry  Neurological:      General: No focal deficit present  Mental Status: She is alert  Mental status is at baseline     Psychiatric:         Mood and Affect: Mood normal          Behavior: Behavior normal

## 2023-03-14 NOTE — PATIENT INSTRUCTIONS
Topics: vaginal discharge    Appropriate cultures obtained at today's visit  Placed order for clindamycin cream  Will follow up on culture results  Patient advised to call the office for any worsening symptoms or questions/concerns

## 2023-03-15 ENCOUNTER — TELEPHONE (OUTPATIENT)
Dept: OBGYN CLINIC | Facility: CLINIC | Age: 38
End: 2023-03-15

## 2023-03-15 LAB
C GLABRATA DNA VAG QL NAA+PROBE: NEGATIVE
C KRUSEI DNA VAG QL NAA+PROBE: NEGATIVE
CANDIDA SP 6 PNL VAG NAA+PROBE: NEGATIVE
T VAGINALIS DNA VAG QL NAA+PROBE: NEGATIVE
VAGINOSIS/ITIS DNA PNL VAG PROBE+SIG AMP: POSITIVE

## 2023-03-15 NOTE — TELEPHONE ENCOUNTER
----- Message from Callie Wolf MD sent at 3/15/2023  1:02 PM EDT -----  Culture from yesterday was positive for BV    Please have patient complete the medication clindamycin cream that was sent to the pharmacy yesterday for treatment    Thanks

## 2023-05-04 ENCOUNTER — HOSPITAL ENCOUNTER (OUTPATIENT)
Dept: RADIOLOGY | Age: 38
Discharge: HOME | End: 2023-05-04

## 2023-05-04 VITALS — HEIGHT: 65 IN | WEIGHT: 210 LBS | BODY MASS INDEX: 34.99 KG/M2

## 2023-05-04 DIAGNOSIS — Z12.31 ENCOUNTER FOR SCREENING MAMMOGRAM FOR MALIGNANT NEOPLASM OF BREAST: ICD-10-CM

## 2023-06-19 NOTE — PATIENT INSTRUCTIONS
Rx for Cleocin 2% vaginal cream daily x 7 days sent to pharmacy  Try daily probiotic  Make sure to use hypoallergenic body wash and laundry detergent  Call if symptoms worsen or change 
Patient Specific Counseling (Will Not Stick From Patient To Patient): The patient presents for anticipated excision. Appears to have resolved, clinically, following the biopsy. Discuss the option of topical treatment. Recommend adjuvant imiquimod instead of excision.
Detail Level: Detailed

## 2023-09-07 ENCOUNTER — OFFICE VISIT (OUTPATIENT)
Dept: OBGYN CLINIC | Facility: CLINIC | Age: 38
End: 2023-09-07
Payer: COMMERCIAL

## 2023-09-07 VITALS
BODY MASS INDEX: 34.82 KG/M2 | WEIGHT: 209 LBS | DIASTOLIC BLOOD PRESSURE: 72 MMHG | SYSTOLIC BLOOD PRESSURE: 128 MMHG | HEIGHT: 65 IN

## 2023-09-07 DIAGNOSIS — N92.0 MENORRHAGIA WITH REGULAR CYCLE: ICD-10-CM

## 2023-09-07 DIAGNOSIS — R10.2 PELVIC PAIN: ICD-10-CM

## 2023-09-07 DIAGNOSIS — N94.6 DYSMENORRHEA: ICD-10-CM

## 2023-09-07 DIAGNOSIS — D25.9 UTERINE LEIOMYOMA, UNSPECIFIED LOCATION: Primary | ICD-10-CM

## 2023-09-07 PROCEDURE — 99214 OFFICE O/P EST MOD 30 MIN: CPT | Performed by: OBSTETRICS & GYNECOLOGY

## 2023-09-07 NOTE — PATIENT INSTRUCTIONS
Topic: Large uterine fibroid and pelvic pain and heavy periods    We discussed the large uterine fibroid and clinical symptoms associated with it. We will obtain a follow up U/S to assess for an endometrial stripe and right ovary. We will schedule for an endometrial biopsy in the near future. We will then have a discussion for treatment planning. Patient told to call for any issues or problems which may arise for her.

## 2023-09-07 NOTE — PROGRESS NOTES
Assessment/Plan:    No problem-specific Assessment & Plan notes found for this encounter. Diagnoses and all orders for this visit:    Uterine leiomyoma, unspecified location    Menorrhagia with regular cycle    Dysmenorrhea    Pelvic pain      Uterine leiomyoma  - Patient is scheduled to receive an ultrasound      Subjective:      Patient ID: Song Merrill is a 34 y.o. female presenting today to follow up after discovery of a uterine leiomyoma in the ER on 09/04/2023. Song Merrill is a 34 y.o. female presenting today with heavy menstrual bleeding and abdominal discomfort. She presented at the ER on 09/04/2023 due to significant abdominal discomfort where ultrasound examination revealed a large uterine fibroid. Today she followed up to discuss the next steps in care. LMP was one month ago but she notes they can be irregular. She confirms lower abdomen pain, urinary and bowel frequency, bloating, and heavy menstrual periods. She denies spotting, loss of appetite, chest pain, SOB, fevers and headaches. She states she use OTC pain medications for her abdominal pain which she says slightly improves the pain. At this time we will obtain a follow up ultrasound to try and evaluate the right ovary as well as the endometrial stripe. We will also plan to schedule an endometrial biopsy in the near future. When all results were obtained, we will sit down and discuss the best treatment planning for her and her clinical condition. All questions were answered for the patient at today's visit and     Patient knows to call for any questions, issues, problems or concerns which arise for her. Total time of today's visit was 25 minutes of which greater than 50% was spent face-to-face counseling the patient as well as coordination of care, review of chart and lab values, physical examination as well as computer entry into the [facility] medical record system.         The following portions of the patient's history were reviewed and updated as appropriate: allergies, current medications, past family history, past medical history, past social history, past surgical history and problem list.    Review of Systems   Constitutional: Negative. Negative for appetite change, diaphoresis, fatigue, fever and unexpected weight change. HENT: Negative. Eyes: Negative. Respiratory: Negative. Cardiovascular: Negative. Gastrointestinal: Negative. Negative for abdominal pain, blood in stool, constipation, diarrhea, nausea and vomiting. Patient notes bowel frequency similar to urinary frequency. Endocrine: Negative. Negative for cold intolerance and heat intolerance. Genitourinary: Positive for frequency. Negative for dysuria, hematuria, urgency, vaginal bleeding, vaginal discharge and vaginal pain. Patient is noting significant urinary frequency   Musculoskeletal: Negative. Skin: Negative. Allergic/Immunologic: Negative. Neurological: Negative. Hematological: Negative. Negative for adenopathy. Psychiatric/Behavioral: Negative. Objective:      /72   Ht 5' 4.8" (1.646 m)   Wt 94.8 kg (209 lb)   LMP 08/21/2023 (Approximate)   BMI 34.99 kg/m²          Physical Exam  Constitutional:       Appearance: She is well-developed. HENT:      Head: Normocephalic. Eyes:      Pupils: Pupils are equal, round, and reactive to light. Cardiovascular:      Rate and Rhythm: Normal rate. Pulmonary:      Effort: Pulmonary effort is normal.   Abdominal:      Palpations: Abdomen is soft. Genitourinary:     Labia:         Right: No rash, tenderness, lesion or injury. Left: No rash, tenderness, lesion or injury. Urethra: No urethral swelling or urethral lesion. Vagina: Normal.      Cervix: Normal.      Uterus: Enlarged (12-14 week size uterine fibroid) and tender. Adnexa:         Right: No mass, tenderness or fullness.           Left: No mass, tenderness or fullness. Musculoskeletal:         General: Normal range of motion. Cervical back: Normal range of motion and neck supple. Skin:     General: Skin is warm and dry. Neurological:      General: No focal deficit present. Mental Status: She is alert and oriented to person, place, and time. Psychiatric:         Mood and Affect: Mood normal.         Behavior: Behavior normal.         Thought Content:  Thought content normal.         Judgment: Judgment normal.

## 2023-09-11 ENCOUNTER — ULTRASOUND (OUTPATIENT)
Dept: OBGYN CLINIC | Facility: CLINIC | Age: 38
End: 2023-09-11
Payer: COMMERCIAL

## 2023-09-11 DIAGNOSIS — D25.9 UTERINE LEIOMYOMA, UNSPECIFIED LOCATION: Primary | ICD-10-CM

## 2023-09-11 DIAGNOSIS — R10.2 PELVIC PAIN: ICD-10-CM

## 2023-09-11 PROCEDURE — 76856 US EXAM PELVIC COMPLETE: CPT | Performed by: OBSTETRICS & GYNECOLOGY

## 2023-09-11 NOTE — PROGRESS NOTES
AMB US Pelvic Non OB    Date/Time: 9/11/2023 9:15 AM    Performed by: Ej Horton  Authorized by: Edward Carlin MD    Procedure details:     Indications: leiomyoma and non-obstetric abdominal pain      Technique:  US Pelvic, Non-OB with complete exam  Uterine findings:     Length (cm): 15.9    Height (cm):  10.6    Width (cm):  7.1    Uterine adhesions: not identified      Adnexal mass: not identified      Polyps: not identified      Myomas: identified      Endometrial stripe: identified      Endometrial hyperplasia: not identified      Endometrium thickness (mm):  7  Left ovary findings:     Left ovary:  Visualized    Cysts: not identified      Length (cm): 3.4    Height (cm): 3.2    Width (cm): 2.9  Right ovary findings:     Right ovary:  Visualized    Cysts: not identified      Length (cm): 3.4    Height (cm): 3.5    Width (cm): 3.1  Other findings:     Free pelvic fluid: not identified      Free peritoneal fluid: not identified    Post-Procedure Details:     Impression:  Endo-7mm  Fibroids RT Fundal=55 x 46 x 52 mm, &57 x 51 x 54 mm    Tolerance: Tolerated well, no immediate complications  Additional Procedure Comments:          Dr. Camila Jefferson.  MD  [address] [place] [address] KELLI Ohara, [address]  [phone]

## 2023-09-12 NOTE — PROGRESS NOTES
Pelvic ultrasound result was reviewed and note is made of small uterine fibroids    These were discussed with patient and she was reassured    All questions were answered for her    Patient will be seen in the near future for routine follow-up    Patient told to call for any problems, questions, issues or concerns which may arise for her

## 2023-09-19 ENCOUNTER — PROCEDURE VISIT (OUTPATIENT)
Dept: OBGYN CLINIC | Facility: CLINIC | Age: 38
End: 2023-09-19
Payer: COMMERCIAL

## 2023-09-19 VITALS
DIASTOLIC BLOOD PRESSURE: 84 MMHG | BODY MASS INDEX: 34.45 KG/M2 | SYSTOLIC BLOOD PRESSURE: 128 MMHG | WEIGHT: 206.8 LBS | HEIGHT: 65 IN

## 2023-09-19 DIAGNOSIS — N92.0 MENORRHAGIA WITH REGULAR CYCLE: Primary | ICD-10-CM

## 2023-09-19 DIAGNOSIS — D25.9 UTERINE LEIOMYOMA, UNSPECIFIED LOCATION: ICD-10-CM

## 2023-09-19 PROCEDURE — 58100 BIOPSY OF UTERUS LINING: CPT | Performed by: OBSTETRICS & GYNECOLOGY

## 2023-09-19 PROCEDURE — 88305 TISSUE EXAM BY PATHOLOGIST: CPT | Performed by: PATHOLOGY

## 2023-09-19 NOTE — PROGRESS NOTES
Endometrial biopsy    Date/Time: 9/19/2023 1:15 PM    Performed by: Ирина Chaidez MD  Authorized by: Ирина Chaidez MD  Universal Protocol:  Consent: Verbal consent obtained. Risks and benefits: risks, benefits and alternatives were discussed  Consent given by: patient  Patient understanding: patient states understanding of the procedure being performed  Patient consent: the patient's understanding of the procedure matches consent given  Procedure consent: procedure consent matches procedure scheduled  Relevant documents: relevant documents present and verified  Test results: test results available and properly labeled  Site marked: the operative site was not marked  Radiology Images displayed and confirmed. If images not available, report reviewed: imaging studies available  Required items: required blood products, implants, devices, and special equipment available  Patient identity confirmed: verbally with patient      Indication:     Indications:  Other disorder of menstruation and other abnormal bleeding from female genital tract      Indications comment:  Uterine fibroids  Procedure:     Procedure: endometrial biopsy with Pipelle      A bivalve speculum was placed in the vagina: yes      Cervix cleaned and prepped: yes      A paracervical block was performed: no      An intracervical block was performed: no      The cervix was dilated: no      Uterus sounded: yes      Uterus sound depth (cm):  14    Specimen collected: specimen collected and sent to pathology    Findings:     Uterus size:  13-14 weeks    Cervix: normal      Adnexa: normal    Comments:     Procedure comments:  Topic: Menorrhagia with uterine fibroids     Procedure: Endometrial biopsy    Excellent hemostasis noted     Instructions and restrictions given     All questions answered     Further treatment and follow-up planning will be based upon final pathology results     Patient to call for any problems, questions, issues or concerns which may arise for her

## 2023-09-19 NOTE — PATIENT INSTRUCTIONS
Topic: Abnormal uterine bleeding and history of uterine fibroids     Procedure: Endometrial biopsy    Excellent hemostasis noted     Instructions and restrictions given     All questions answered     Further treatment and follow-up planning will be based upon final pathology results     Patient to call for any problems, questions, issues or concerns which may arise for her

## 2023-09-21 PROCEDURE — 88305 TISSUE EXAM BY PATHOLOGIST: CPT | Performed by: PATHOLOGY

## 2023-09-22 ENCOUNTER — TELEPHONE (OUTPATIENT)
Dept: OBGYN CLINIC | Facility: CLINIC | Age: 38
End: 2023-09-22

## 2023-09-22 NOTE — TELEPHONE ENCOUNTER
Please let patient know that her biopsy results were perfectly benign    Also make a brief office visit appointment for her next week so she and I can sit down and discuss symptoms and treatment planning with her regarding probable hysterectomy    Thanks

## 2023-09-22 NOTE — TELEPHONE ENCOUNTER
Patient requesting doctor call her to review endometrial biopsy results. She said she is also feeling some pain in pelvic region again.

## 2023-09-25 ENCOUNTER — OFFICE VISIT (OUTPATIENT)
Dept: OBGYN CLINIC | Facility: CLINIC | Age: 38
End: 2023-09-25
Payer: COMMERCIAL

## 2023-09-25 VITALS
SYSTOLIC BLOOD PRESSURE: 118 MMHG | DIASTOLIC BLOOD PRESSURE: 82 MMHG | BODY MASS INDEX: 35.72 KG/M2 | HEIGHT: 65 IN | WEIGHT: 214.4 LBS

## 2023-09-25 DIAGNOSIS — N92.0 MENORRHAGIA WITH REGULAR CYCLE: Primary | ICD-10-CM

## 2023-09-25 DIAGNOSIS — D25.9 UTERINE LEIOMYOMA, UNSPECIFIED LOCATION: ICD-10-CM

## 2023-09-25 DIAGNOSIS — N94.6 DYSMENORRHEA: ICD-10-CM

## 2023-09-25 PROCEDURE — 99213 OFFICE O/P EST LOW 20 MIN: CPT | Performed by: OBSTETRICS & GYNECOLOGY

## 2023-09-25 NOTE — PROGRESS NOTES
Assessment/Plan:    No problem-specific Assessment & Plan notes found for this encounter. Problem List Items Addressed This Visit    None  Visit Diagnoses     Menorrhagia with regular cycle    -  Primary    Relevant Orders    Ambulatory Referral to Obstetrics / Gynecology    Dysmenorrhea        Relevant Orders    Ambulatory Referral to Obstetrics / Gynecology    Uterine leiomyoma, unspecified location                Subjective:      Patient ID: Vero Freeman is a 34 y.o. female. Pily is a 49-year-old female presenting to the office to discuss hysterectomies. The patient has been experiencing menorrhagia with uterine fibroids that have significantly impacted her lifestyle. After assessing the patient in previous appointments and evaluating her ultrasound. It was deemed that the patient would be a good candidate to receive a hysterectomy to resolve her symptoms. Patient was referred to a laparoscopic surgeon Dr. Fadia Adames.  Any and all questions or concerns were answered at this time. Patient was encouraged to call if she has any questions, complaints, or concerns in the interim. Total time of today's visit was 20 minutes of which greater than 50% was spent face-to-face counseling the patient as well as coordination of care, review of chart and lab values, physical examination as well as computer entry into the [facility] medical record system. The following portions of the patient's history were reviewed and updated as appropriate: allergies, current medications, past family history, past medical history, past social history, past surgical history and problem list.    Review of Systems   Constitutional: Negative. Negative for appetite change, diaphoresis, fatigue, fever and unexpected weight change. HENT: Negative. Eyes: Negative. Respiratory: Negative. Cardiovascular: Negative. Gastrointestinal: Negative.   Negative for abdominal pain, blood in stool, constipation, diarrhea, nausea and vomiting. Endocrine: Negative. Negative for cold intolerance and heat intolerance. Genitourinary: Negative. Negative for dysuria, frequency, hematuria, urgency, vaginal bleeding, vaginal discharge and vaginal pain. Musculoskeletal: Negative. Skin: Negative. Allergic/Immunologic: Negative. Neurological: Negative. Hematological: Negative. Negative for adenopathy. Psychiatric/Behavioral: Negative. Objective:      /82   Ht 5' 4.8" (1.646 m)   Wt 97.3 kg (214 lb 6.4 oz)   LMP 09/19/2023 (Exact Date)   BMI 35.90 kg/m²          Physical Exam  Constitutional:       Appearance: She is well-developed. HENT:      Head: Normocephalic. Eyes:      Pupils: Pupils are equal, round, and reactive to light. Cardiovascular:      Rate and Rhythm: Normal rate. Pulmonary:      Effort: Pulmonary effort is normal.   Musculoskeletal:         General: Normal range of motion. Cervical back: Normal range of motion and neck supple. Skin:     General: Skin is warm and dry. Neurological:      General: No focal deficit present. Mental Status: She is alert and oriented to person, place, and time. Psychiatric:         Mood and Affect: Mood normal.         Behavior: Behavior normal.         Thought Content:  Thought content normal.         Judgment: Judgment normal.

## 2023-09-25 NOTE — PATIENT INSTRUCTIONS
Topic: Menorrhagia and uterine fibroids. Patient's ultrasound was evaluated and results were discussed with the patient. Based of a previous appointments and imaging results, it was determined that a hysterectomy would be the next best step in management. Discussed hysterectomies with patient. Patient was agreeable to the surgery.   Patient was referred to Dr Caleb Alvarado

## 2023-10-18 ENCOUNTER — CONSULT (OUTPATIENT)
Dept: GYNECOLOGY | Facility: CLINIC | Age: 38
End: 2023-10-18

## 2023-10-18 VITALS
WEIGHT: 214.6 LBS | SYSTOLIC BLOOD PRESSURE: 118 MMHG | HEART RATE: 76 BPM | HEIGHT: 65 IN | BODY MASS INDEX: 35.75 KG/M2 | DIASTOLIC BLOOD PRESSURE: 82 MMHG

## 2023-10-18 DIAGNOSIS — Z01.818 PREOP TESTING: Primary | ICD-10-CM

## 2023-10-18 DIAGNOSIS — N94.6 DYSMENORRHEA: ICD-10-CM

## 2023-10-18 DIAGNOSIS — D21.9 FIBROIDS: Primary | ICD-10-CM

## 2023-10-18 DIAGNOSIS — R10.2 PELVIC PAIN: ICD-10-CM

## 2023-10-18 DIAGNOSIS — N92.0 MENORRHAGIA WITH REGULAR CYCLE: ICD-10-CM

## 2023-10-18 NOTE — LETTER
2023     [place] [id number]   [address] [place] [id number]    Patient: Lowell Kaminski   YOB: 1985   Date of Visit: 10/18/2023       Dear Dr. Concepcion Bonilla: Thank you for referring Lowell Kaminski to me for evaluation. Below are my notes for this consultation. If you have questions, please do not hesitate to call me. I look forward to following your patient along with you. Sincerely,        Jas Dela Cruz DO        CC: No Recipients    Jas Dela Cruz DO  10/18/2023 10:41 AM  Incomplete  Assessment/Plan:       Diagnoses and all orders for this visit:    Fibroids; discussed fibroid uterus and options including following versus medical management versus uterine artery embolization versus surgical management with either myomectomy or hysterectomy. Patient has opted to proceed with a hysterectomy. Discussed [facility] BS versus [facility] BS. She has opted to proceed with an [facility] BS. The procedure along with risks were discussed including, but not limited to, infection, hemorrhage, bowel bladder or vascular injury, possible necessity for laparotomy. Menorrhagia with regular cycle      Dysmenorrhea  -      Pelvic pain     Subjective:      Patient ID: Lowell Kaminski is a 34 y.o. female. HPI ,  X1 new paient referred by Dr Concepcion Bonilla secondary to symptomatic fibroid uterus. Patient has been noticing increasing pelvic pressure urinary frequency, abdominal distention over the past several months. Her menses are regular but very heavy for the first 2 days associated with passage of large clots. Endometrial biopsy done on  was benign    Ultrasound;  Transabdominal and transvaginal ultrasound of the pelvis was performed with   color-flow imaging and spectral analysis. The examination is limited due to the   patient's body habitus and overlying bowel gas.  The uterus measures 12.0 x 7.3 x   8.3 cm with diffuse heterogeneous echotexture and normal vascular flow. There is   a 10.0 x 7.1 x 7.8 cm hypoechoic heterogeneous solid fibroid in the midline   body, better seen on transabdominal imaging. The endometrium is not identified   due to the large central fibroid. The right ovary is not visualized due to   overlying bowel gas. The left ovary is only seen on transabdominal imaging and   measures 3.3 x 2.9 x 3.4 cm with normal venous and arterial Doppler signal and   flow. There is no evidence of free fluid. The following portions of the patient's history were reviewed and updated as appropriate: She  has no past medical history on file. She   Patient Active Problem List    Diagnosis Date Noted   • Elevated blood pressure reading 02/03/2023   • Atypical squamous cells of undetermined significance (ASCUS) on Papanicolaou smear of cervix 02/03/2023   • Obesity (BMI 30.0-34.9) 07/23/2020   • Alopecia areata 07/23/2020     She  has no past surgical history on file. Her family history includes Lung cancer in her maternal grandmother; No Known Problems in her daughter, father, half-sister, half-sister, maternal aunt, maternal aunt, maternal grandfather, mother, paternal aunt, paternal aunt, paternal aunt, paternal aunt, paternal aunt, paternal grandfather, and paternal grandmother. She  reports that she has never smoked. She has never used smokeless tobacco. She reports that she does not currently use alcohol. She reports that she does not use drugs. No current outpatient medications on file. No current facility-administered medications for this visit.      Current Outpatient Medications on File Prior to Visit   Medication Sig   • [DISCONTINUED] amoxicillin (AMOXIL) 500 mg capsule TAKE ONE CAPLET BY MOUTH THREE TIMES DAILY (Patient not taking: Reported on 2/9/2023)   • [DISCONTINUED] clindamycin (CLEOCIN) 2 % vaginal cream Insert 1 applicator into the vagina daily at bedtime   • [DISCONTINUED] ibuprofen (MOTRIN) 800 mg tablet TAKE ONE TABLET BY MOUTH EVERY SIX HOURS AS NEEDED PAIN (Patient not taking: Reported on 2/9/2023)     No current facility-administered medications on file prior to visit. She has No Known Allergies. .    Review of Systems   Constitutional: Negative. Gastrointestinal:  Positive for abdominal distention. Negative for constipation, diarrhea, nausea and vomiting. Genitourinary:  Positive for frequency, menstrual problem and pelvic pain. Negative for difficulty urinating. Objective:      /82   Pulse 76   Ht 5' 4.8" (1.646 m)   Wt 97.3 kg (214 lb 9.6 oz)   LMP 09/19/2023 (Exact Date)   BMI 35.93 kg/m²          Physical Exam  Vitals reviewed. Cardiovascular:      Rate and Rhythm: Normal rate and regular rhythm. Pulses: Normal pulses. Heart sounds: Normal heart sounds. Pulmonary:      Effort: Pulmonary effort is normal. No respiratory distress. Breath sounds: Normal breath sounds. Abdominal:      Palpations: Abdomen is soft. There is mass (Fundus to 16 cm). Tenderness: There is no abdominal tenderness. There is no guarding or rebound. Hernia: No hernia is present. There is no hernia in the left inguinal area or right inguinal area. Genitourinary:     General: Normal vulva. Labia:         Right: No rash, tenderness or lesion. Left: No rash, tenderness or lesion. Vagina: Normal.      Cervix: Normal.      Uterus: Enlarged (Consistent with 16-week gestation). Musculoskeletal:      Cervical back: Normal range of motion and neck supple. No tenderness. Lymphadenopathy:      Cervical: No cervical adenopathy. Lower Body: No right inguinal adenopathy. No left inguinal adenopathy. Neurological:      Mental Status: She is alert.

## 2023-10-18 NOTE — PROGRESS NOTES
Assessment/Plan:       Diagnoses and all orders for this visit:    Fibroids; discussed fibroid uterus and options including following versus medical management versus uterine artery embolization versus surgical management with either myomectomy or hysterectomy. Patient has opted to proceed with a hysterectomy. Discussed [facility] BS versus [facility] BS. She has opted to proceed with an [facility] BS. The procedure along with risks were discussed including, but not limited to, infection, hemorrhage, bowel bladder or vascular injury, possible necessity for laparotomy. Menorrhagia with regular cycle      Dysmenorrhea  -      Pelvic pain      Subjective:      Patient ID: Kendell Liao is a 34 y.o. female. HPI ,  X1 new paient referred by Dr Fabi Martins secondary to symptomatic fibroid uterus. Patient has been noticing increasing pelvic pressure urinary frequency, abdominal distention over the past several months. Her menses are regular but very heavy for the first 2 days associated with passage of large clots. Endometrial biopsy done on  was benign    Ultrasound;  Transabdominal and transvaginal ultrasound of the pelvis was performed with   color-flow imaging and spectral analysis. The examination is limited due to the   patient's body habitus and overlying bowel gas. The uterus measures 12.0 x 7.3 x   8.3 cm with diffuse heterogeneous echotexture and normal vascular flow. There is   a 10.0 x 7.1 x 7.8 cm hypoechoic heterogeneous solid fibroid in the midline   body, better seen on transabdominal imaging. The endometrium is not identified   due to the large central fibroid. The right ovary is not visualized due to   overlying bowel gas. The left ovary is only seen on transabdominal imaging and   measures 3.3 x 2.9 x 3.4 cm with normal venous and arterial Doppler signal and   flow. There is no evidence of free fluid.      The following portions of the patient's history were reviewed and updated as appropriate: She  has no past medical history on file. She   Patient Active Problem List    Diagnosis Date Noted    Elevated blood pressure reading 02/03/2023    Atypical squamous cells of undetermined significance (ASCUS) on Papanicolaou smear of cervix 02/03/2023    Obesity (BMI 30.0-34.9) 07/23/2020    Alopecia areata 07/23/2020     She  has no past surgical history on file. Her family history includes Lung cancer in her maternal grandmother; No Known Problems in her daughter, father, half-sister, half-sister, maternal aunt, maternal aunt, maternal grandfather, mother, paternal aunt, paternal aunt, paternal aunt, paternal aunt, paternal aunt, paternal grandfather, and paternal grandmother. She  reports that she has never smoked. She has never used smokeless tobacco. She reports that she does not currently use alcohol. She reports that she does not use drugs. No current outpatient medications on file. No current facility-administered medications for this visit. Current Outpatient Medications on File Prior to Visit   Medication Sig    [DISCONTINUED] amoxicillin (AMOXIL) 500 mg capsule TAKE ONE CAPLET BY MOUTH THREE TIMES DAILY (Patient not taking: Reported on 2/9/2023)    [DISCONTINUED] clindamycin (CLEOCIN) 2 % vaginal cream Insert 1 applicator into the vagina daily at bedtime    [DISCONTINUED] ibuprofen (MOTRIN) 800 mg tablet TAKE ONE TABLET BY MOUTH EVERY SIX HOURS AS NEEDED PAIN (Patient not taking: Reported on 2/9/2023)     No current facility-administered medications on file prior to visit. She has No Known Allergies. .    Review of Systems   Constitutional: Negative. Gastrointestinal:  Positive for abdominal distention. Negative for constipation, diarrhea, nausea and vomiting. Genitourinary:  Positive for frequency, menstrual problem and pelvic pain. Negative for difficulty urinating.          Objective:      /82   Pulse 76   Ht 5' 4.8" (1.646 m)   Wt 97.3 kg (214 lb 9.6 oz) LMP 09/19/2023 (Exact Date)   BMI 35.93 kg/m²          Physical Exam  Vitals reviewed. Cardiovascular:      Rate and Rhythm: Normal rate and regular rhythm. Pulses: Normal pulses. Heart sounds: Normal heart sounds. Pulmonary:      Effort: Pulmonary effort is normal. No respiratory distress. Breath sounds: Normal breath sounds. Abdominal:      Palpations: Abdomen is soft. There is mass (Fundus to 16 cm). Tenderness: There is no abdominal tenderness. There is no guarding or rebound. Hernia: No hernia is present. There is no hernia in the left inguinal area or right inguinal area. Genitourinary:     General: Normal vulva. Labia:         Right: No rash, tenderness or lesion. Left: No rash, tenderness or lesion. Vagina: Normal.      Cervix: Normal.      Uterus: Enlarged (Consistent with 16-week gestation). Musculoskeletal:      Cervical back: Normal range of motion and neck supple. No tenderness. Lymphadenopathy:      Cervical: No cervical adenopathy. Lower Body: No right inguinal adenopathy. No left inguinal adenopathy. Neurological:      Mental Status: She is alert.

## 2023-11-14 PROCEDURE — NC001 PR NO CHARGE: Performed by: OBSTETRICS & GYNECOLOGY

## 2023-11-14 NOTE — H&P
Assessment/Plan:         Diagnoses and all orders for this visit:     Fibroids; discussed fibroid uterus and options including following versus medical management versus uterine artery embolization versus surgical management with either myomectomy or hysterectomy. Patient has opted to proceed with a hysterectomy. Discussed [facility] BS versus [facility] BS. She has opted to proceed with an [facility] BS. The procedure along with risks were discussed including, but not limited to, infection, hemorrhage, bowel bladder or vascular injury, possible necessity for laparotomy. Menorrhagia with regular cycle        Dysmenorrhea  -       Pelvic pain       Subjective:       Patient ID: Leidy Ball is a 34 y.o. female. HPI ,  X1 new paient referred by Dr Jenny Aguillon secondary to symptomatic fibroid uterus. Patient has been noticing increasing pelvic pressure urinary frequency, abdominal distention over the past several months. Her menses are regular but very heavy for the first 2 days associated with passage of large clots. Endometrial biopsy done on  was benign     Ultrasound;  Transabdominal and transvaginal ultrasound of the pelvis was performed with   color-flow imaging and spectral analysis. The examination is limited due to the   patient's body habitus and overlying bowel gas. The uterus measures 12.0 x 7.3 x   8.3 cm with diffuse heterogeneous echotexture and normal vascular flow. There is   a 10.0 x 7.1 x 7.8 cm hypoechoic heterogeneous solid fibroid in the midline   body, better seen on transabdominal imaging. The endometrium is not identified   due to the large central fibroid. The right ovary is not visualized due to   overlying bowel gas. The left ovary is only seen on transabdominal imaging and   measures 3.3 x 2.9 x 3.4 cm with normal venous and arterial Doppler signal and   flow. There is no evidence of free fluid.       The following portions of the patient's history were reviewed and updated as appropriate: She  has no past medical history on file. She        Patient Active Problem List     Diagnosis Date Noted    Elevated blood pressure reading 02/03/2023    Atypical squamous cells of undetermined significance (ASCUS) on Papanicolaou smear of cervix 02/03/2023    Obesity (BMI 30.0-34.9) 07/23/2020    Alopecia areata 07/23/2020      She  has no past surgical history on file. Her family history includes Lung cancer in her maternal grandmother; No Known Problems in her daughter, father, half-sister, half-sister, maternal aunt, maternal aunt, maternal grandfather, mother, paternal aunt, paternal aunt, paternal aunt, paternal aunt, paternal aunt, paternal grandfather, and paternal grandmother. She  reports that she has never smoked. She has never used smokeless tobacco. She reports that she does not currently use alcohol. She reports that she does not use drugs. No current outpatient medications on file. No current facility-administered medications for this visit. Current Outpatient Medications on File Prior to Visit   Medication Sig    [DISCONTINUED] amoxicillin (AMOXIL) 500 mg capsule TAKE ONE CAPLET BY MOUTH THREE TIMES DAILY (Patient not taking: Reported on 2/9/2023)    [DISCONTINUED] clindamycin (CLEOCIN) 2 % vaginal cream Insert 1 applicator into the vagina daily at bedtime    [DISCONTINUED] ibuprofen (MOTRIN) 800 mg tablet TAKE ONE TABLET BY MOUTH EVERY SIX HOURS AS NEEDED PAIN (Patient not taking: Reported on 2/9/2023)      No current facility-administered medications on file prior to visit. She has No Known Allergies. .     Review of Systems   Constitutional: Negative. Gastrointestinal:  Positive for abdominal distention. Negative for constipation, diarrhea, nausea and vomiting. Genitourinary:  Positive for frequency, menstrual problem and pelvic pain. Negative for difficulty urinating.           Objective:        /82   Pulse 76   Ht 5' 4.8" (1.646 m)   Wt 97.3 kg (214 lb 9.6 oz)   LMP 09/19/2023 (Exact Date)   BMI 35.93 kg/m²             Physical Exam  Vitals reviewed. Cardiovascular:      Rate and Rhythm: Normal rate and regular rhythm. Pulses: Normal pulses. Heart sounds: Normal heart sounds. Pulmonary:      Effort: Pulmonary effort is normal. No respiratory distress. Breath sounds: Normal breath sounds. Abdominal:      Palpations: Abdomen is soft. There is mass (Fundus to 16 cm). Tenderness: There is no abdominal tenderness. There is no guarding or rebound. Hernia: No hernia is present. There is no hernia in the left inguinal area or right inguinal area. Genitourinary:     General: Normal vulva. Labia:         Right: No rash, tenderness or lesion. Left: No rash, tenderness or lesion. Vagina: Normal.      Cervix: Normal.      Uterus: Enlarged (Consistent with 16-week gestation). Musculoskeletal:      Cervical back: Normal range of motion and neck supple. No tenderness. Lymphadenopathy:      Cervical: No cervical adenopathy. Lower Body: No right inguinal adenopathy. No left inguinal adenopathy. Neurological:      Mental Status: She is alert.

## 2023-11-28 ENCOUNTER — APPOINTMENT (OUTPATIENT)
Dept: LAB | Facility: CLINIC | Age: 38
End: 2023-11-28
Payer: COMMERCIAL

## 2023-11-28 ENCOUNTER — OFFICE VISIT (OUTPATIENT)
Dept: FAMILY MEDICINE CLINIC | Facility: CLINIC | Age: 38
End: 2023-11-28

## 2023-11-28 VITALS
BODY MASS INDEX: 35.68 KG/M2 | SYSTOLIC BLOOD PRESSURE: 140 MMHG | HEIGHT: 64 IN | WEIGHT: 209 LBS | OXYGEN SATURATION: 98 % | DIASTOLIC BLOOD PRESSURE: 96 MMHG | HEART RATE: 84 BPM | TEMPERATURE: 98 F

## 2023-11-28 DIAGNOSIS — R51.9 ACUTE INTRACTABLE HEADACHE, UNSPECIFIED HEADACHE TYPE: Primary | ICD-10-CM

## 2023-11-28 DIAGNOSIS — Z01.818 PREOP TESTING: ICD-10-CM

## 2023-11-28 DIAGNOSIS — E66.9 OBESITY (BMI 30.0-34.9): ICD-10-CM

## 2023-11-28 DIAGNOSIS — R03.0 ELEVATED BLOOD PRESSURE READING: ICD-10-CM

## 2023-11-28 LAB
EST. AVERAGE GLUCOSE BLD GHB EST-MCNC: 108 MG/DL
HBA1C MFR BLD: 5.4 %
HCT VFR BLD AUTO: 39.5 % (ref 34.8–46.1)
HGB BLD-MCNC: 13.2 G/DL (ref 11.5–15.4)

## 2023-11-28 PROCEDURE — 86900 BLOOD TYPING SEROLOGIC ABO: CPT | Performed by: OBSTETRICS & GYNECOLOGY

## 2023-11-28 PROCEDURE — 86901 BLOOD TYPING SEROLOGIC RH(D): CPT | Performed by: OBSTETRICS & GYNECOLOGY

## 2023-11-28 PROCEDURE — 86850 RBC ANTIBODY SCREEN: CPT | Performed by: OBSTETRICS & GYNECOLOGY

## 2023-11-28 PROCEDURE — 99213 OFFICE O/P EST LOW 20 MIN: CPT | Performed by: FAMILY MEDICINE

## 2023-11-28 PROCEDURE — 36415 COLL VENOUS BLD VENIPUNCTURE: CPT

## 2023-11-28 PROCEDURE — 83036 HEMOGLOBIN GLYCOSYLATED A1C: CPT

## 2023-11-28 PROCEDURE — 85018 HEMOGLOBIN: CPT

## 2023-11-28 PROCEDURE — 85014 HEMATOCRIT: CPT

## 2023-11-28 RX ORDER — UBIDECARENONE 100 MG
100 CAPSULE ORAL DAILY
Qty: 90 CAPSULE | Refills: 0 | Status: SHIPPED | OUTPATIENT
Start: 2023-11-28 | End: 2024-02-26

## 2023-11-28 RX ORDER — CALCIUM CARBONATE/VITAMIN D3 500-10/5ML
400 LIQUID (ML) ORAL DAILY
Qty: 90 TABLET | Refills: 0 | Status: SHIPPED | OUTPATIENT
Start: 2023-11-28 | End: 2024-02-26

## 2023-11-28 RX ORDER — RIBOFLAVIN (VITAMIN B2) 400 MG
400 TABLET ORAL DAILY
Qty: 90 TABLET | Refills: 0 | Status: SHIPPED | OUTPATIENT
Start: 2023-11-28 | End: 2024-02-26

## 2023-11-28 RX ORDER — IBUPROFEN 800 MG/1
800 TABLET ORAL EVERY 8 HOURS PRN
Qty: 30 TABLET | Refills: 0 | Status: SHIPPED | OUTPATIENT
Start: 2023-11-28

## 2023-11-28 NOTE — ASSESSMENT & PLAN NOTE
- hx of intermittent headaches over the last several months with acute worsening over the last 2 weeks; daily headache in frontal region, non-radiating  - associated with elevated BP readings  - can use ibuprofen 800 mg 1 tab to break headache as it had aborted her headaches in the past  - start following supplements: magnesium oxide 400 mg daily, CoEnzyme Q10 100 mg daily, Riboflavin (Vitamin B2) 400 mg daily  - keep headache diary  - use tylenol/ibuprofen prn   - recommend adequate hydration ( ounces with no caffeine)  - maintain good sleep pattern  - f/u 4-6 weeks

## 2023-11-28 NOTE — PROGRESS NOTES
Name: Raquel Maloney      : 1985      MRN: [id number]  Encounter Provider: Gabriella Cottrell DO  Encounter Date: 2023   Encounter department: [place]    Assessment & Plan     1. Acute intractable headache, unspecified headache type  Assessment & Plan:  - hx of intermittent headaches over the last several months with acute worsening over the last 2 weeks; daily headache in frontal region, non-radiating  - associated with elevated BP readings  - can use ibuprofen 800 mg 1 tab to break headache as it had aborted her headaches in the past  - start following supplements: magnesium oxide 400 mg daily, CoEnzyme Q10 100 mg daily, Riboflavin (Vitamin B2) 400 mg daily  - keep headache diary  - use tylenol/ibuprofen prn   - recommend adequate hydration ( ounces with no caffeine)  - maintain good sleep pattern  - recommend f/u with eye doctor for annual exams  - f/u 4-6 weeks     Orders:  -     Magnesium Oxide 400 MG CAPS; Take 1 tablet (400 mg total) by mouth in the morning  -     co-enzyme Q-10 100 mg capsule; Take 1 capsule (100 mg total) by mouth daily  -     Riboflavin 400 MG TABS; Take 1 tablet (400 mg total) by mouth in the morning  -     ibuprofen (MOTRIN) 800 mg tablet; Take 1 tablet (800 mg total) by mouth every 8 (eight) hours as needed for mild pain    2. Elevated blood pressure reading  Assessment & Plan:  - elevated /96 in office today  - elevated on repeat to 156/96 in setting of acute headache  - will work on controlling headache as above and continue to monitor BP              Subjective      HPI    68-year-old female presents for evaluation of headaches. Patient has a history of headaches on and off for the last several months, initially thought to be sinus related as it usually began in between her eyes. Reports worsening over the last 2 weeks. Currently has a frontal headache above her eyes, non-radiating, pulsing sensation.  Reports that it is constant. Ibuprofen 800 mg 1 tab usually aborts the headache but pt only takes it about once a week. Reports this current headache has been ongoing for about a week. Has not taken any medication in days. Denies any nausea, vomiting, abdominal pain. Denies any photophobia or phonophobia. No family history of migraines or strokes. Patient is overdue for an eye exam and notes that she does work in front of a computer and is on her phone all day for work. Hydrating well, but poor sleep because of the headaches (wakes at 2 AM and cannot sleep until 6 AM)      Review of Systems   Constitutional:  Negative for chills and fever. HENT:  Negative for congestion and rhinorrhea. Eyes:  Negative for visual disturbance. Respiratory:  Negative for cough and shortness of breath. Cardiovascular:  Negative for chest pain and palpitations. Gastrointestinal:  Negative for abdominal pain, constipation, diarrhea, nausea and vomiting. Neurological:  Positive for headaches. Negative for dizziness and light-headedness. No current outpatient medications on file prior to visit. Objective     /96 (BP Location: Left arm, Patient Position: Sitting, Cuff Size: Large)   Pulse 84   Temp 98 °F (36.7 °C) (Temporal)   Ht 5' 4" (1.626 m)   Wt 94.8 kg (209 lb)   SpO2 98%   BMI 35.87 kg/m²     Physical Exam  Vitals reviewed. Constitutional:       General: She is not in acute distress. Appearance: Normal appearance. HENT:      Head: Normocephalic and atraumatic. Right Ear: External ear normal.      Left Ear: External ear normal.      Nose: Nose normal.      Mouth/Throat:      Pharynx: Oropharynx is clear. Eyes:      General:         Right eye: No discharge. Left eye: No discharge. Extraocular Movements: Extraocular movements intact.       Conjunctiva/sclera: Conjunctivae normal.      Comments: Squinting during visit   Cardiovascular:      Rate and Rhythm: Normal rate and regular rhythm. Pulses: Normal pulses. Pulmonary:      Effort: Pulmonary effort is normal.   Musculoskeletal:      Cervical back: Neck supple. Skin:     General: Skin is warm. Capillary Refill: Capillary refill takes less than 2 seconds. Neurological:      General: No focal deficit present. Mental Status: She is alert and oriented to person, place, and time. Cranial Nerves: No cranial nerve deficit. Gait: Gait normal.   Psychiatric:         Mood and Affect: Mood normal.         Behavior: Behavior normal.         Thought Content:  Thought content normal.         Judgment: Judgment normal.       Luz Guzman, DO

## 2023-11-28 NOTE — ASSESSMENT & PLAN NOTE
- elevated /96 in office today  - elevated on repeat to 156/96 in setting of acute headache  - will work on controlling headache as above and continue to monitor BP

## 2023-11-28 NOTE — ASSESSMENT & PLAN NOTE
- hx of intermittent headaches over the last several months with acute worsening over the last 2 weeks; daily headache in frontal region, non-radiating  - associated with elevated BP readings  - can use ibuprofen 800 mg 1 tab to break headache as it had aborted her headaches in the past  - start following supplements: magnesium oxide 400 mg daily, CoEnzyme Q10 100 mg daily, Riboflavin (Vitamin B2) 400 mg daily  - keep headache diary  - use tylenol/ibuprofen prn   - recommend adequate hydration ( ounces with no caffeine)  - maintain good sleep pattern  - recommend f/u with eye doctor for annual exams  - f/u 4-6 weeks

## 2023-11-29 ENCOUNTER — LAB REQUISITION (OUTPATIENT)
Dept: LAB | Facility: HOSPITAL | Age: 38
End: 2023-11-29
Payer: COMMERCIAL

## 2023-11-29 DIAGNOSIS — Z01.818 ENCOUNTER FOR OTHER PREPROCEDURAL EXAMINATION: ICD-10-CM

## 2023-11-29 LAB
ABO GROUP BLD: NORMAL
BLD GP AB SCN SERPL QL: NEGATIVE
RH BLD: POSITIVE
SPECIMEN EXPIRATION DATE: NORMAL

## 2023-11-30 ENCOUNTER — ANESTHESIA EVENT (OUTPATIENT)
Dept: PERIOP | Facility: HOSPITAL | Age: 38
End: 2023-11-30
Payer: COMMERCIAL

## 2023-12-05 NOTE — PRE-PROCEDURE INSTRUCTIONS
Pre-Surgery Instructions:   Medication Instructions    ibuprofen (MOTRIN) 800 mg tablet Stop taking 3 days prior to surgery. Pt instructed to not start any new medications or OTC vitamins /supplements- instructed to avoid any NSAID products 3 days prior to surgery. Tylenol ok to use between now and DOS as needed     Pt instructed on cleanser use both night before and day of surgery - pt verbalized understanding of cleansing instructions. Pt instructed on NO hair or body products for DOS  Pt instructed electric shaving between now and 24 hrs prior to surgery. Pt instructed that Ensure drinks should be followed per surgeon office instructions, if need to verify use of last morning DOS drink - verify with nurse calling with pre op call. Medication instructions for day surgery reviewed. Please use only a sip of water to take your instructed medications. Avoid all over the counter vitamins, supplements and NSAIDS for one week prior to surgery per anesthesia guidelines. Tylenol is ok to take as needed. You will receive a call one business day prior to surgery with an arrival time and hospital directions. If your surgery is scheduled on a Monday, the hospital will be calling you on the Friday prior to your surgery. If you have not heard from anyone by 8pm, please call the hospital supervisor through the hospital  at [phone]. Victoriano Troncosos [phone]). Do not eat or drink anything after midnight the night before your surgery, including candy, mints, lifesavers, or chewing gum. Do not drink alcohol 24hrs before your surgery. Try not to smoke at least 24hrs before your surgery. Follow the pre surgery showering instructions as listed in the [facility] Surgical Experience Booklet” or otherwise provided by your surgeon's office. Do not use a blade to shave the surgical area 1 week before surgery. It is okay to use a clean electric clippers up to 24 hours before surgery.  Do not apply any lotions, creams, including makeup, cologne, deodorant, or perfumes after showering on the day of your surgery. Do not use dry shampoo, hair spray, hair gel, or any type of hair products. No contact lenses, eye make-up, or artificial eyelashes. Remove nail polish, including gel polish, and any artificial, gel, or acrylic nails if possible. Remove all jewelry including rings and body piercing jewelry. Wear causal clothing that is easy to take on and off. Consider your type of surgery. Keep any valuables, jewelry, piercings at home. Please bring any specially ordered equipment (sling, braces) if indicated. Arrange for a responsible person to drive you to and from the hospital on the day of your surgery. Visitor Guidelines discussed. Call the surgeon's office with any new illnesses, exposures, or additional questions prior to surgery. Please reference your [facility] Surgical Experience Booklet” for additional information to prepare for your upcoming surgery.

## 2023-12-12 ENCOUNTER — HOSPITAL ENCOUNTER (OUTPATIENT)
Facility: HOSPITAL | Age: 38
Setting detail: OUTPATIENT SURGERY
Discharge: HOME | End: 2023-12-12
Attending: OBSTETRICS & GYNECOLOGY | Admitting: OBSTETRICS & GYNECOLOGY
Payer: COMMERCIAL

## 2023-12-12 ENCOUNTER — ANESTHESIA (OUTPATIENT)
Dept: PERIOP | Facility: HOSPITAL | Age: 38
End: 2023-12-12
Payer: COMMERCIAL

## 2023-12-12 VITALS
SYSTOLIC BLOOD PRESSURE: 138 MMHG | OXYGEN SATURATION: 100 % | HEART RATE: 97 BPM | DIASTOLIC BLOOD PRESSURE: 92 MMHG | TEMPERATURE: 96.9 F | WEIGHT: 213.41 LBS | BODY MASS INDEX: 36.43 KG/M2 | HEIGHT: 64 IN | RESPIRATION RATE: 18 BRPM

## 2023-12-12 DIAGNOSIS — D25.9 UTERINE LEIOMYOMA, UNSPECIFIED LOCATION: ICD-10-CM

## 2023-12-12 DIAGNOSIS — G89.18 POSTOPERATIVE PAIN: Primary | ICD-10-CM

## 2023-12-12 PROBLEM — E66.09 CLASS 2 OBESITY DUE TO EXCESS CALORIES WITHOUT SERIOUS COMORBIDITY WITH BODY MASS INDEX (BMI) OF 36.0 TO 36.9 IN ADULT: Status: ACTIVE | Noted: 2023-12-12

## 2023-12-12 PROBLEM — E66.811 OBESITY (BMI 30.0-34.9): Status: RESOLVED | Noted: 2020-07-23 | Resolved: 2023-12-12

## 2023-12-12 PROBLEM — E66.812 CLASS 2 OBESITY DUE TO EXCESS CALORIES WITHOUT SERIOUS COMORBIDITY WITH BODY MASS INDEX (BMI) OF 36.0 TO 36.9 IN ADULT: Status: ACTIVE | Noted: 2023-12-12

## 2023-12-12 PROBLEM — E66.9 OBESITY (BMI 30.0-34.9): Status: RESOLVED | Noted: 2020-07-23 | Resolved: 2023-12-12

## 2023-12-12 LAB
ABO GROUP BLD: NORMAL
EXT PREGNANCY TEST URINE: NEGATIVE
EXT. CONTROL: NORMAL
RH BLD: POSITIVE

## 2023-12-12 PROCEDURE — 81025 URINE PREGNANCY TEST: CPT | Performed by: OBSTETRICS & GYNECOLOGY

## 2023-12-12 PROCEDURE — 58544 LSH W/T/O UTERUS ABOVE 250 G: CPT | Performed by: OBSTETRICS & GYNECOLOGY

## 2023-12-12 PROCEDURE — 88307 TISSUE EXAM BY PATHOLOGIST: CPT | Performed by: PATHOLOGY

## 2023-12-12 RX ORDER — ACETAMINOPHEN 325 MG/1
975 TABLET ORAL EVERY 6 HOURS PRN
Refills: 0
Start: 2023-12-12

## 2023-12-12 RX ORDER — ONDANSETRON 2 MG/ML
4 INJECTION INTRAMUSCULAR; INTRAVENOUS ONCE AS NEEDED
Status: DISCONTINUED | OUTPATIENT
Start: 2023-12-12 | End: 2023-12-12 | Stop reason: HOSPADM

## 2023-12-12 RX ORDER — DEXAMETHASONE SODIUM PHOSPHATE 10 MG/ML
INJECTION, SOLUTION INTRAMUSCULAR; INTRAVENOUS AS NEEDED
Status: DISCONTINUED | OUTPATIENT
Start: 2023-12-12 | End: 2023-12-12

## 2023-12-12 RX ORDER — LORAZEPAM 2 MG/ML
1 INJECTION INTRAMUSCULAR ONCE
Status: COMPLETED | OUTPATIENT
Start: 2023-12-12 | End: 2023-12-12

## 2023-12-12 RX ORDER — ACETAMINOPHEN 325 MG/1
975 TABLET ORAL EVERY 6 HOURS PRN
Status: DISCONTINUED | OUTPATIENT
Start: 2023-12-12 | End: 2023-12-12 | Stop reason: HOSPADM

## 2023-12-12 RX ORDER — SUCCINYLCHOLINE/SOD CL,ISO/PF 100 MG/5ML
SYRINGE (ML) INTRAVENOUS AS NEEDED
Status: DISCONTINUED | OUTPATIENT
Start: 2023-12-12 | End: 2023-12-12

## 2023-12-12 RX ORDER — ONDANSETRON 2 MG/ML
4 INJECTION INTRAMUSCULAR; INTRAVENOUS ONCE
Status: COMPLETED | OUTPATIENT
Start: 2023-12-12 | End: 2023-12-12

## 2023-12-12 RX ORDER — MAGNESIUM HYDROXIDE 1200 MG/15ML
LIQUID ORAL AS NEEDED
Status: DISCONTINUED | OUTPATIENT
Start: 2023-12-12 | End: 2023-12-12 | Stop reason: HOSPADM

## 2023-12-12 RX ORDER — OXYCODONE HYDROCHLORIDE 5 MG/1
10 TABLET ORAL EVERY 4 HOURS PRN
Status: DISCONTINUED | OUTPATIENT
Start: 2023-12-12 | End: 2023-12-12 | Stop reason: HOSPADM

## 2023-12-12 RX ORDER — OXYCODONE HYDROCHLORIDE AND ACETAMINOPHEN 5; 325 MG/1; MG/1
1 TABLET ORAL EVERY 4 HOURS PRN
Qty: 15 TABLET | Refills: 0 | Status: SHIPPED | OUTPATIENT
Start: 2023-12-12

## 2023-12-12 RX ORDER — IBUPROFEN 600 MG/1
600 TABLET ORAL EVERY 6 HOURS PRN
Status: DISCONTINUED | OUTPATIENT
Start: 2023-12-12 | End: 2023-12-12 | Stop reason: HOSPADM

## 2023-12-12 RX ORDER — ROCURONIUM BROMIDE 10 MG/ML
INJECTION, SOLUTION INTRAVENOUS AS NEEDED
Status: DISCONTINUED | OUTPATIENT
Start: 2023-12-12 | End: 2023-12-12

## 2023-12-12 RX ORDER — SODIUM CHLORIDE 9 MG/ML
INJECTION, SOLUTION INTRAVENOUS CONTINUOUS PRN
Status: DISCONTINUED | OUTPATIENT
Start: 2023-12-12 | End: 2023-12-12

## 2023-12-12 RX ORDER — CEFAZOLIN SODIUM 2 G/50ML
2000 SOLUTION INTRAVENOUS ONCE
Status: COMPLETED | OUTPATIENT
Start: 2023-12-12 | End: 2023-12-12

## 2023-12-12 RX ORDER — SODIUM CHLORIDE 9 MG/ML
125 INJECTION, SOLUTION INTRAVENOUS CONTINUOUS
Status: DISCONTINUED | OUTPATIENT
Start: 2023-12-12 | End: 2023-12-12 | Stop reason: HOSPADM

## 2023-12-12 RX ORDER — HYDROMORPHONE HCL/PF 1 MG/ML
0.5 SYRINGE (ML) INJECTION
Status: DISCONTINUED | OUTPATIENT
Start: 2023-12-12 | End: 2023-12-12 | Stop reason: HOSPADM

## 2023-12-12 RX ORDER — OXYCODONE HYDROCHLORIDE 5 MG/1
5 TABLET ORAL EVERY 4 HOURS PRN
Status: DISCONTINUED | OUTPATIENT
Start: 2023-12-12 | End: 2023-12-12 | Stop reason: HOSPADM

## 2023-12-12 RX ORDER — PROPOFOL 10 MG/ML
INJECTION, EMULSION INTRAVENOUS AS NEEDED
Status: DISCONTINUED | OUTPATIENT
Start: 2023-12-12 | End: 2023-12-12

## 2023-12-12 RX ORDER — MIDAZOLAM HYDROCHLORIDE 2 MG/2ML
INJECTION, SOLUTION INTRAMUSCULAR; INTRAVENOUS AS NEEDED
Status: DISCONTINUED | OUTPATIENT
Start: 2023-12-12 | End: 2023-12-12

## 2023-12-12 RX ORDER — LIDOCAINE HYDROCHLORIDE 20 MG/ML
INJECTION, SOLUTION EPIDURAL; INFILTRATION; INTRACAUDAL; PERINEURAL AS NEEDED
Status: DISCONTINUED | OUTPATIENT
Start: 2023-12-12 | End: 2023-12-12

## 2023-12-12 RX ORDER — FENTANYL CITRATE/PF 50 MCG/ML
50 SYRINGE (ML) INJECTION
Status: DISCONTINUED | OUTPATIENT
Start: 2023-12-12 | End: 2023-12-12 | Stop reason: HOSPADM

## 2023-12-12 RX ORDER — FENTANYL CITRATE 50 UG/ML
INJECTION, SOLUTION INTRAMUSCULAR; INTRAVENOUS AS NEEDED
Status: DISCONTINUED | OUTPATIENT
Start: 2023-12-12 | End: 2023-12-12

## 2023-12-12 RX ORDER — ONDANSETRON 2 MG/ML
4 INJECTION INTRAMUSCULAR; INTRAVENOUS EVERY 6 HOURS PRN
Status: DISCONTINUED | OUTPATIENT
Start: 2023-12-12 | End: 2023-12-12 | Stop reason: HOSPADM

## 2023-12-12 RX ORDER — KETOROLAC TROMETHAMINE 30 MG/ML
INJECTION, SOLUTION INTRAMUSCULAR; INTRAVENOUS AS NEEDED
Status: DISCONTINUED | OUTPATIENT
Start: 2023-12-12 | End: 2023-12-12

## 2023-12-12 RX ORDER — ONDANSETRON 2 MG/ML
INJECTION INTRAMUSCULAR; INTRAVENOUS AS NEEDED
Status: DISCONTINUED | OUTPATIENT
Start: 2023-12-12 | End: 2023-12-12

## 2023-12-12 RX ADMIN — FENTANYL CITRATE 50 MCG: 50 INJECTION INTRAMUSCULAR; INTRAVENOUS at 08:27

## 2023-12-12 RX ADMIN — SODIUM CHLORIDE: 0.9 INJECTION, SOLUTION INTRAVENOUS at 07:45

## 2023-12-12 RX ADMIN — KETOROLAC TROMETHAMINE 30 MG: 30 INJECTION, SOLUTION INTRAMUSCULAR at 09:47

## 2023-12-12 RX ADMIN — SODIUM CHLORIDE: 0.9 INJECTION, SOLUTION INTRAVENOUS at 09:58

## 2023-12-12 RX ADMIN — ONDANSETRON 4 MG: 2 INJECTION INTRAMUSCULAR; INTRAVENOUS at 09:47

## 2023-12-12 RX ADMIN — Medication 100 MG: at 07:37

## 2023-12-12 RX ADMIN — ROCURONIUM BROMIDE 40 MG: 10 INJECTION, SOLUTION INTRAVENOUS at 07:55

## 2023-12-12 RX ADMIN — ACETAMINOPHEN 325MG 975 MG: 325 TABLET ORAL at 12:34

## 2023-12-12 RX ADMIN — LIDOCAINE HYDROCHLORIDE 100 MG: 20 INJECTION, SOLUTION EPIDURAL; INFILTRATION; INTRACAUDAL at 07:37

## 2023-12-12 RX ADMIN — FENTANYL CITRATE 50 MCG: 50 INJECTION INTRAMUSCULAR; INTRAVENOUS at 07:37

## 2023-12-12 RX ADMIN — ONDANSETRON 4 MG: 2 INJECTION INTRAMUSCULAR; INTRAVENOUS at 06:43

## 2023-12-12 RX ADMIN — DEXAMETHASONE SODIUM PHOSPHATE 4 MG: 10 INJECTION INTRAMUSCULAR; INTRAVENOUS at 07:37

## 2023-12-12 RX ADMIN — LORAZEPAM 1 MG: 2 INJECTION INTRAMUSCULAR; INTRAVENOUS at 06:43

## 2023-12-12 RX ADMIN — FENTANYL CITRATE 50 MCG: 50 INJECTION INTRAMUSCULAR; INTRAVENOUS at 07:52

## 2023-12-12 RX ADMIN — ROCURONIUM BROMIDE 20 MG: 10 INJECTION, SOLUTION INTRAVENOUS at 08:43

## 2023-12-12 RX ADMIN — ROCURONIUM BROMIDE 10 MG: 10 INJECTION, SOLUTION INTRAVENOUS at 07:37

## 2023-12-12 RX ADMIN — SUGAMMADEX 200 MG: 100 INJECTION, SOLUTION INTRAVENOUS at 10:13

## 2023-12-12 RX ADMIN — PROPOFOL 200 MG: 10 INJECTION, EMULSION INTRAVENOUS at 07:37

## 2023-12-12 RX ADMIN — CEFAZOLIN SODIUM 2000 MG: 2 SOLUTION INTRAVENOUS at 07:29

## 2023-12-12 RX ADMIN — MIDAZOLAM 2 MG: 1 INJECTION INTRAMUSCULAR; INTRAVENOUS at 07:29

## 2023-12-12 RX ADMIN — SODIUM CHLORIDE 125 ML/HR: 0.9 INJECTION, SOLUTION INTRAVENOUS at 06:28

## 2023-12-12 RX ADMIN — FENTANYL CITRATE 50 MCG: 50 INJECTION INTRAMUSCULAR; INTRAVENOUS at 09:47

## 2023-12-12 NOTE — ANESTHESIA PREPROCEDURE EVALUATION
Procedure:  [facility]) W/ BILATERAL SALPINGECTOMY (Abdomen)    Relevant Problems   NEURO/PSYCH   (+) Acute intractable headache      Musculoskeletal and Integument   (+) Alopecia areata      Other   (+) Class 2 obesity due to excess calories without serious comorbidity with body mass index (BMI) of 36.0 to 36.9 in adult   (+) Elevated blood pressure reading        Physical Exam    Airway    Mallampati score: II  TM Distance: >3 FB  Neck ROM: full     Dental   No notable dental hx     Cardiovascular  Rhythm: regular, Rate: normal, Cardiovascular exam normal    Pulmonary  Pulmonary exam normal Breath sounds clear to auscultation    Other Findings  post-pubertal.      Anesthesia Plan  ASA Score- 2     Anesthesia Type- general with ASA Monitors. Additional Monitors:     Airway Plan: ETT. Comment: Anxious, dry heaving before interview. No prior anesthetics. Ondansetron 4 IV, lorazepam 1 IV, long discussion of expectations and plan. .       Plan Factors-    Chart reviewed. Existing labs reviewed. Patient summary reviewed. Patient is not a current smoker. Patient not instructed to abstain from smoking on day of procedure. Patient did not smoke on day of surgery. There is medical exclusion for perioperative obstructive sleep apnea risk education. Induction- intravenous and rapid sequence induction. Postoperative Plan-     Informed Consent- Anesthetic plan and risks discussed with patient.

## 2023-12-12 NOTE — ANESTHESIA POSTPROCEDURE EVALUATION
Post-Op Assessment Note    CV Status:  Stable    Pain management: adequate       Mental Status:  Alert and awake   Hydration Status:  Euvolemic   PONV Controlled:  Controlled   Airway Patency:  Patent     Post Op Vitals Reviewed: Yes      Staff: Anesthesiologist               /74 (12/12/23 1124)    Temp (!) 97.3 °F (36.3 °C) (12/12/23 1124)    Pulse 94 (12/12/23 1124)   Resp 19 (12/12/23 1124)    SpO2 95 % (12/12/23 1124)

## 2023-12-12 NOTE — INTERVAL H&P NOTE
H&P reviewed. After examining the patient I find no changes in the patients condition since the H&P had been written.     Vitals:    12/12/23 0600   BP: 159/95   Pulse: 101   Resp: 16   Temp: 98 °F (36.7 °C)   SpO2: 98%

## 2023-12-12 NOTE — OP NOTE
OPERATIVE REPORT  PATIENT NAME: Kendell Liao    :  1985  MRN: [id number]  Pt Location: [facility] OR ROOM 06    SURGERY DATE: 2023    Surgeon(s) and Role:     Jie Cortez DO - Primary     * Jeannie Friedman MD - Assisting    Preop Diagnosis:  Uterine leiomyoma, unspecified location [D25.9]    Post-Op Diagnosis Codes:     * Uterine leiomyoma, unspecified location [D25.9]    Procedure(s):  [facility]) W/ BILATERAL SALPINGECTOMY. cystoscopy    Specimen(s):  ID Type Source Tests Collected by Time Destination   1 : Uterus and bilateral fallopian tubes Tissue Uterus TISSUE Adonis Olson  2023 1001        Estimated Blood Loss: Minimal    Drains:  [REMOVED] Urethral Catheter Double-lumen;Non-latex 16 Fr. (Removed)   Number of days: 0       Anesthesia Type:   General    Operative Indications:  Uterine leiomyoma, unspecified location [D25.9]    Operative Findings:  - Large bulky uterus with specimen weighing 537 grams with several large fibroids  - Normal appearing Fallopian tubes and ovaries   - Normal appearing liver and gallbladder  - Cystoscopy: efflux noted from bilateral ureteral orifices. No mesh, suture material, or injury noted to bladder lumen. Complications: None    Procedure and Technique:  The patient was properly identified in the holding area by the operating room staff and attending physician. She was taken to operating room where general anesthesia was instituted without complication. She was placed in the dorsal lithotomy position with her legs in [address] with care taken to avoid excessive flexion or extension of her lower extremities. Once the patient was properly positioned, the patient was prepped and draped in normal sterile fashion. A time-out was performed. The patient was again properly identified by the operating room staff and attending physician. At this time, the patient was receiving antibiotics for prophylaxis.  The uterine manipulator was placed without complications. Only the tip of the GENEVIEVE was used after we sounded the uterus to 15 cm, therefore a 12 cm Genevieve tip was used. A Ceron catheter was aseptically inserted. Gloves were changed, and attention was directed to the abdomen. Two Allis clamps were used to dona the umbilicus. A 10-mm incision was created at the level of the umbilicus. A Veress needle was utilized to get into the peritoneal axis. Once we were inside intraperitoneum, we allowed CO2 gas to go in until we reached a pressure of 15 mmHg. Once we reached that pressure, the 10-mm optical trocar was inserted under direct visualization. Once we were inside the peritoneal cavity, two additional 5mm trocars were placed on the lower quadrants. Once the 3 trocars were placed, we started the procedure in Trendelenburg position. First, a survey of the cavity was performed, and we confirmed the above-mentioned findings. We started a salpingectomy on the right side. The salpingectomy was performed by transecting the right fallopian tube to the mesosalpinx all the way to the level of the cornu. This was resected using the Ligasure device for this purpose. Next, we were able to transect the utero-ovarian ligament on the right side and take it down to the broad ligament. The posterior lip of the peritoneum was skeletonized until we were able to see the uterine arteries at the level of the internal cervical os. Visualization was challenging given the large bulky fibroids, so a 30 degree scope was used. The vesicouterine space was dissected down to decrease bladder involvement and injury. The uterine arteries were then cauterized but not cut. The left side underwent the same procedure. The round ligament was opened on the left side and we developed the vesicouterine space from left to right until the the bladder was dissected down.      Once we secured the uterine arteries bilaterally, we began transecting off the uterine corpus from the cervix using a mixture of Bovie electrocautery and the Ligasure. Once the corpus was free, the cervical stump was inspected and noted to be hemostatic. The decision was made to make a mini laparotomy incision for specimen extraction. A 3 cm skin incision was then created at the level of the suprapubic area in the midline in a horizontal fashion. We used a scalpel to create an incision, and then dissection was carried down until we reached the rectus fascia with the bovie. The rectus fascia was opened with the bovie. Once we were able to extend the rectus fascia laterally, two kocher clamps were used to retract the fascia which was  off the rectus muscles sharply. The peritoneal edges were grasped with hemostats and entered sharply. Once we entered the peritoneal cavity, the incision was stretched bluntly. We then inserted a small Isaac O retractor and once it was secured in place, we covered it with a GelPoint with one port. We reinsufflated the abdomen again. We then introduced the large 3400 mL containment bag without complications. A GelPoint was placed to cover the [address] O retractor, and then we obtained pneumoperitoneum again. The bag was unraveled and the specimen was placed in the bag. The bag was retrieved without complications through the Isaac O retractor and Kocher clamps were placed to grasp the specimen within the bag. Sequential coring with a scalpel was performed following the letter "C" shape and this was fully contained in the bag until the specimen was out. The GelPoint was then closed again for pneumoperitoneum. Survey of the pelvis noted no active bleeding after irrigation. The Isaac O retractor was removed. The fascia was closed with 0 Vicryl suture in a running stitch. The trocars released pneumoperitoneum and were removed. The mini laparotomy subcutaneous fat was closed with 3-0 plaingut suture. The mini laparotomy skin incision was closed with 4-0 Monocryl in a running subcuticular suture. The port sites were closed with 3-0 interrupted plaingut suture. The skin incisions was covered with bandaids. Cystoscopy was performed and efflux was seen from both ureteral jets and the bladder was systematically inspected and there was no evidence of any injury. The patient tolerated the procedure well. All instrument, sharps and sponge counts were correct x 2. The Ceron was removed and the patient went to the recovery room in stable condition, and she is stable at the moment of dictation. Dr. Jamal Levin was present for the entire procedure.        Patient Disposition:  PACU         SIGNATURE: Fernando Steven MD  DATE: December 12, 2023  TIME: 10:16 AM

## 2023-12-14 PROCEDURE — 88307 TISSUE EXAM BY PATHOLOGIST: CPT | Performed by: PATHOLOGY

## 2023-12-22 ENCOUNTER — TELEPHONE (OUTPATIENT)
Dept: GYNECOLOGY | Facility: CLINIC | Age: 38
End: 2023-12-22

## 2023-12-22 NOTE — TELEPHONE ENCOUNTER
Patient called in stating she is itchy at her incision site and is having some abdominal pain. No bleeding or discharge from incision.

## 2023-12-26 ENCOUNTER — OFFICE VISIT (OUTPATIENT)
Dept: GYNECOLOGY | Facility: CLINIC | Age: 38
End: 2023-12-26

## 2023-12-26 VITALS
SYSTOLIC BLOOD PRESSURE: 130 MMHG | BODY MASS INDEX: 36.42 KG/M2 | DIASTOLIC BLOOD PRESSURE: 90 MMHG | WEIGHT: 212.2 LBS | HEART RATE: 90 BPM

## 2023-12-26 DIAGNOSIS — Z48.89 POSTOPERATIVE VISIT: Primary | ICD-10-CM

## 2023-12-26 PROCEDURE — 99024 POSTOP FOLLOW-UP VISIT: CPT | Performed by: OBSTETRICS & GYNECOLOGY

## 2023-12-26 NOTE — LETTER
"December 26, 2023     Ronni Fagan MD  [address]  [address]  [facility] [place] [address]    Patient: Gin Pastrana   YOB: 1985   Date of Visit: 12/26/2023       Dear Dr. Fagan:    Thank you for referring Gin Pastrana to me for evaluation. Below are my notes for this consultation.    If you have questions, please do not hesitate to call me. I look forward to following your patient along with you.         Sincerely,        Harrison Larose DO        CC: No Recipients    Harrison Larose DO  12/26/2023  9:25 AM  Incomplete  Patient presents for postoperative check.  She is status post LSH BS on December 12.  She is doing well since surgery with no complaints.    Physical exam: Port sites are healing well with no erythema exudate or induration.    Path report:  Final Diagnosis   A. Uterus and bilateral fallopian tubes, \"Uterus and bilateral fallopian tubes,\" Hysterectomy and salpingectomy:  - Uterus, 509 grams  -- Endocervix:   - Benign endocervical glands   -- Endometrium:   - Benign secretory endometrium               - Negative for hyperplasia or carcinoma  -- Myometrium:   - Leiomyomata   -- Fallopian tubes, Bilateral  - Complete cross section of bilateral fallopian tubes with paratubal cysts and no diagnostic abnormality  - Negative for malignancy       Impression: Normal postoperative check    Plan: Return to Dr. Fagan for ongoing GYN care  "

## 2023-12-26 NOTE — PROGRESS NOTES
"Patient presents for postoperative check.  She is status post LSH BS on December 12.  She is doing well since surgery with no complaints.    Physical exam: Port sites are healing well with no erythema exudate or induration.    Path report:  Final Diagnosis   A. Uterus and bilateral fallopian tubes, \"Uterus and bilateral fallopian tubes,\" Hysterectomy and salpingectomy:  - Uterus, 509 grams  -- Endocervix:   - Benign endocervical glands   -- Endometrium:   - Benign secretory endometrium               - Negative for hyperplasia or carcinoma  -- Myometrium:   - Leiomyomata   -- Fallopian tubes, Bilateral  - Complete cross section of bilateral fallopian tubes with paratubal cysts and no diagnostic abnormality  - Negative for malignancy       Impression: Normal postoperative check    Plan: Return to Dr. Fagan for ongoing GYN care  "

## 2023-12-28 ENCOUNTER — TELEPHONE (OUTPATIENT)
Dept: FAMILY MEDICINE CLINIC | Facility: CLINIC | Age: 38
End: 2023-12-28

## 2024-01-03 ENCOUNTER — TELEPHONE (OUTPATIENT)
Dept: GYNECOLOGY | Facility: CLINIC | Age: 39
End: 2024-01-03

## 2024-01-03 NOTE — TELEPHONE ENCOUNTER
Patient called stating she had hysterectomy on 12/19 and is having stomach pains, sharp lower left side.  She states the pain came on suddenly and is very sharp.  She states if she pushes where pain is, she does have internal pain.  Pain scale at 6.  She also states she is still bleeding, light flow.  Not changing pad often.   She is concerned.  Please advise.

## 2024-01-03 NOTE — TELEPHONE ENCOUNTER
I spoke with pt who states she has had light bleeding since surgery 12/19/23. This am she had sharp pain in LLQ which has subsided. She was advised to go to ED but pt is reluctant to go. Will get pt on my schedule as soon as possible, but patient instructed to go to ED with worsening pain/bleeding. She verbalized understanding of importance.

## 2024-01-03 NOTE — TELEPHONE ENCOUNTER
Called patient and advised to go to ED.  She is reluctant to go.  Not sure she will follow thru on those instructions.

## 2024-01-05 ENCOUNTER — OFFICE VISIT (OUTPATIENT)
Dept: OBGYN CLINIC | Facility: CLINIC | Age: 39
End: 2024-01-05

## 2024-01-05 VITALS
BODY MASS INDEX: 36.25 KG/M2 | WEIGHT: 211.2 LBS | DIASTOLIC BLOOD PRESSURE: 82 MMHG | SYSTOLIC BLOOD PRESSURE: 118 MMHG | HEART RATE: 84 BPM

## 2024-01-05 DIAGNOSIS — G89.18 POSTOPERATIVE PAIN: Primary | ICD-10-CM

## 2024-01-05 PROCEDURE — 99024 POSTOP FOLLOW-UP VISIT: CPT | Performed by: OBSTETRICS & GYNECOLOGY

## 2024-01-05 NOTE — PROGRESS NOTES
Assessment/Plan:    Pt made aware to call office with any further pain. Pt advised that brown spotting is decreasing and can persist for 6 weeks. She was advised to call if bleeding increases. Pelvic rest for 6 weeks post op.       Diagnoses and all orders for this visit:    Postoperative pain        Subjective:      Patient ID: Gin Pastrana is a 38 y.o. female.    Pt presents for post op problem. She is status post LSH BS on 12/12/23, 3 1/2 weeks ago. She called two days ago with one episode of sharp pain in LLQ. She was also concerned because of continued brown spotting. Today, she states pain resolved that afternoon. She has not felt it again. She states she initially have constipation. She is now moving her bowels without difficulty. She denies dysuria. She also states the brown spotting has been decreasing. She denies sexual activity.         The following portions of the patient's history were reviewed and updated as appropriate: allergies, current medications, past family history, past medical history, past social history, past surgical history and problem list.    Review of Systems   Constitutional: Negative.    HENT: Negative.     Respiratory: Negative.     Cardiovascular: Negative.    Gastrointestinal:  Positive for abdominal pain (LLQ).   Endocrine: Negative.    Genitourinary:  Negative for difficulty urinating, dysuria, frequency, pelvic pain, urgency, vaginal bleeding, vaginal discharge and vaginal pain.   Musculoskeletal: Negative.    Skin: Negative.    Neurological: Negative.    Psychiatric/Behavioral: Negative.           Objective:      /82   Pulse 84   Wt 95.8 kg (211 lb 3.2 oz)   LMP 11/21/2023 (Approximate) Comment: LMP 11/21/23  per pt  BMI 36.25 kg/m²          Physical Exam  Vitals and nursing note reviewed.   Constitutional:       Appearance: Normal appearance.   HENT:      Head: Normocephalic and atraumatic.   Pulmonary:      Effort: Pulmonary effort is normal.   Abdominal:       Hernia: There is no hernia in the left inguinal area or right inguinal area.      Comments: Abdominal ports well healed. No hernias noted.   Genitourinary:     General: Normal vulva.      Exam position: Supine.      Pubic Area: No rash.       Labia:         Right: No rash, tenderness, lesion or injury.         Left: No rash, tenderness, lesion or injury.       Urethra: No urethral pain, urethral swelling or urethral lesion.      Vagina: No signs of injury and foreign body. Bleeding (scant amount of light brown blood) present. No vaginal discharge, erythema, tenderness or lesions.      Cervix: No cervical motion tenderness, friability, erythema or eversion.   Musculoskeletal:         General: Normal range of motion.      Cervical back: Normal range of motion.   Lymphadenopathy:      Lower Body: No right inguinal adenopathy. No left inguinal adenopathy.   Skin:     General: Skin is warm and dry.   Neurological:      Mental Status: She is alert and oriented to person, place, and time.   Psychiatric:         Mood and Affect: Mood normal.         Behavior: Behavior normal.         Thought Content: Thought content normal.         Judgment: Judgment normal.

## 2024-02-02 ENCOUNTER — TELEPHONE (OUTPATIENT)
Dept: OBGYN CLINIC | Facility: CLINIC | Age: 39
End: 2024-02-02

## 2024-02-02 DIAGNOSIS — N30.00 ACUTE CYSTITIS WITHOUT HEMATURIA: Primary | ICD-10-CM

## 2024-02-02 RX ORDER — NITROFURANTOIN 25; 75 MG/1; MG/1
100 CAPSULE ORAL 2 TIMES DAILY
Qty: 14 CAPSULE | Refills: 0 | Status: SHIPPED | OUTPATIENT
Start: 2024-02-02

## 2024-02-02 NOTE — TELEPHONE ENCOUNTER
Patient having symptoms of dysuria, urgency, voiding lesser amts.  Patient advised to take Macrobid as directed & increase fluid intake.  Please sign off on prescription for same to [facility] ([address]).

## 2024-02-02 NOTE — TELEPHONE ENCOUNTER
Patient calling with symptoms of a UTI. She wants to know if she should take something over the counter or get something prescribed.

## 2024-02-02 NOTE — TELEPHONE ENCOUNTER
Please treat with Macrobid dispense 14 tablets take 1 tablet twice daily for 7 days    Also encourage adequate hydration    Thanks

## 2024-02-06 ENCOUNTER — TELEPHONE (OUTPATIENT)
Dept: OBGYN CLINIC | Facility: CLINIC | Age: 39
End: 2024-02-06

## 2024-02-06 DIAGNOSIS — N89.8 VAGINAL ITCHING: Primary | ICD-10-CM

## 2024-02-06 RX ORDER — FLUCONAZOLE 150 MG/1
150 TABLET ORAL ONCE
Qty: 1 TABLET | Refills: 0 | Status: SHIPPED | OUTPATIENT
Start: 2024-02-06 | End: 2024-02-06

## 2024-02-06 NOTE — TELEPHONE ENCOUNTER
Taking Macrobid for UTI symptoms - will complete 2/9/2024.  Having vaginal itching presently.  Patient states she usually taking Diflucan with antibiotic use.  If agree, please sign off on prescription for Diflucan to [facility] ([place]).

## 2024-03-04 ENCOUNTER — OFFICE VISIT (OUTPATIENT)
Dept: OBGYN CLINIC | Facility: CLINIC | Age: 39
End: 2024-03-04

## 2024-03-04 ENCOUNTER — NURSE TRIAGE (OUTPATIENT)
Age: 39
End: 2024-03-04

## 2024-03-04 VITALS
DIASTOLIC BLOOD PRESSURE: 84 MMHG | WEIGHT: 214.8 LBS | SYSTOLIC BLOOD PRESSURE: 122 MMHG | HEIGHT: 64 IN | BODY MASS INDEX: 36.67 KG/M2

## 2024-03-04 DIAGNOSIS — R82.90 ABNORMAL URINE: Primary | ICD-10-CM

## 2024-03-04 DIAGNOSIS — B37.9 ANTIBIOTIC-INDUCED YEAST INFECTION: ICD-10-CM

## 2024-03-04 DIAGNOSIS — T36.95XA ANTIBIOTIC-INDUCED YEAST INFECTION: ICD-10-CM

## 2024-03-04 LAB
SL AMB  POCT GLUCOSE, UA: ABNORMAL
SL AMB LEUKOCYTE ESTERASE,UA: ABNORMAL
SL AMB POCT BILIRUBIN,UA: 1
SL AMB POCT BLOOD,UA: ABNORMAL
SL AMB POCT CLARITY,UA: CLEAR
SL AMB POCT COLOR,UA: YELLOW
SL AMB POCT KETONES,UA: ABNORMAL
SL AMB POCT NITRITE,UA: ABNORMAL
SL AMB POCT PH,UA: 6
SL AMB POCT SPECIFIC GRAVITY,UA: 1.02
SL AMB POCT URINE PROTEIN: 15
SL AMB POCT UROBILINOGEN: 0.2

## 2024-03-04 PROCEDURE — 87086 URINE CULTURE/COLONY COUNT: CPT | Performed by: OBSTETRICS & GYNECOLOGY

## 2024-03-04 RX ORDER — FLUCONAZOLE 150 MG/1
150 TABLET ORAL ONCE
Qty: 1 TABLET | Refills: 0 | Status: SHIPPED | OUTPATIENT
Start: 2024-03-04 | End: 2024-03-04

## 2024-03-04 RX ORDER — FLUCONAZOLE 150 MG/1
TABLET ORAL
COMMUNITY
Start: 2024-02-06

## 2024-03-04 RX ORDER — CEPHALEXIN 500 MG/1
CAPSULE ORAL
Qty: 14 CAPSULE | Refills: 0 | Status: SHIPPED | OUTPATIENT
Start: 2024-03-04 | End: 2024-03-12

## 2024-03-04 NOTE — PROGRESS NOTES
Assessment/Plan:    No problem-specific Assessment & Plan notes found for this encounter.       There are no diagnoses linked to this encounter.      Abnormal Urine      Antibiotic-induced Yeast Infection    Subjective:      Patient ID: Gin Pastrana is a 38 y.o. female.    Gni is a 38-year-old female presenting to the office for persistent UTI-like symptoms.  She had burning, frequency, and urgency 2 weeks ago and was treated with Macrobid for a UTI.  She took all of the medication as prescribed and did not miss any doses.  This morning she woke up and began feeling frequency of urination and came in to be evaluated.  She denies any fever, chills, flank pain, hematuria, burning, itching, lesions, rashes or change to bowel habits.  Urine dip in office was positive and sent for culture. Recommend beginning Keflex twice daily for 1 week followed by Diflucan as she has had yeast infections from antibiotics in the past.  Follow-up as needed in office.      Total time of today's visit was 20 minutes of which greater than 50% was spent face-to-face counseling the patient as well as coordination of care, review of chart and lab values, physical examination as well as computer entry into the [email or web address] medical record system.      The following portions of the patient's history were reviewed and updated as appropriate: allergies, current medications, past family history, past medical history, past social history, past surgical history, and problem list.    Review of Systems   Constitutional: Negative.  Negative for appetite change, diaphoresis, fatigue, fever and unexpected weight change.   HENT: Negative.     Eyes: Negative.    Respiratory: Negative.     Cardiovascular: Negative.    Gastrointestinal: Negative.  Negative for abdominal pain, blood in stool, constipation, diarrhea, nausea and vomiting.   Endocrine: Negative.  Negative for cold intolerance and heat intolerance.   Genitourinary:  Positive for frequency. Negative  "for dysuria, hematuria, urgency, vaginal bleeding, vaginal discharge and vaginal pain.   Musculoskeletal: Negative.    Skin: Negative.    Allergic/Immunologic: Negative.    Neurological: Negative.    Hematological: Negative.  Negative for adenopathy.   Psychiatric/Behavioral: Negative.           Objective:      /84   Ht 5' 4\" (1.626 m)   Wt 97.4 kg (214 lb 12.8 oz)   LMP 11/21/2023 (Approximate) Comment: LMP 11/21/23  per pt  BMI 36.87 kg/m²          Physical Exam  Constitutional:       Appearance: She is well-developed.   HENT:      Head: Normocephalic.   Eyes:      Pupils: Pupils are equal, round, and reactive to light.   Cardiovascular:      Rate and Rhythm: Normal rate and regular rhythm.   Pulmonary:      Effort: Pulmonary effort is normal.      Breath sounds: Normal breath sounds.   Abdominal:      Palpations: Abdomen is soft.   Musculoskeletal:         General: Normal range of motion.      Cervical back: Normal range of motion and neck supple.   Skin:     General: Skin is warm and dry.   Neurological:      Mental Status: She is alert and oriented to person, place, and time.   Psychiatric:         Behavior: Behavior normal.         Thought Content: Thought content normal.         Judgment: Judgment normal.           "

## 2024-03-04 NOTE — TELEPHONE ENCOUNTER
"Reason for Disposition  • Painful urination AND EITHER frequency or urgency    Answer Assessment - Initial Assessment Questions  1. SEVERITY: \"How bad is the pain?\"  (e.g., Scale 1-10; mild, moderate, or severe)    - MILD (1-3): complains slightly about urination hurting    - MODERATE (4-7): interferes with normal activities      - SEVERE (8-10): excruciating, unwilling or unable to urinate because of the pain       mild  2. FREQUENCY: \"How many times have you had painful urination today?\"       10   3. PATTERN: \"Is pain present every time you urinate or just sometimes?\"       Yes at end of stream  4. ONSET: \"When did the painful urination start?\"       This morning  5. FEVER: \"Do you have a fever?\" If Yes, ask: \"What is your temperature, how was it measured, and when did it start?\"      no  6. PAST UTI: \"Have you had a urine infection before?\" If Yes, ask: \"When was the last time?\" and \"What happened that time?\"       yes  7. CAUSE: \"What do you think is causing the painful urination?\"  (e.g., UTI, scratch, Herpes sore)      Treated with macrobid  8. OTHER SYMPTOMS: \"Do you have any other symptoms?\" (e.g., flank pain, vaginal discharge, genital sores, urgency, blood in urine)      No sense of urgency, just frequency  9. PREGNANCY: \"Is there any chance you are pregnant?\" \"When was your last menstrual period?\"      hysterectomy    Protocols used: Urination Pain - Female-ADULT-[place]    "

## 2024-03-04 NOTE — TELEPHONE ENCOUNTER
Regarding: Frequent urination  ----- Message from Alize Clinton sent at 3/4/2024  9:22 AM EST -----  Frequent urination

## 2024-03-04 NOTE — PATIENT INSTRUCTIONS
Topic: UTI  Urine dip in office was positive, sent for culture and office will be in contact.  Recommend beginning Keflex 500 mg twice daily for 1 week followed by Diflucan one-time dose.  Follow-up as needed in office.  Call office with any worsening symptoms, concerns, or questions.

## 2024-03-05 LAB — BACTERIA UR CULT: NORMAL

## 2024-03-06 ENCOUNTER — TELEPHONE (OUTPATIENT)
Dept: OBGYN CLINIC | Facility: CLINIC | Age: 39
End: 2024-03-06

## 2024-05-07 ENCOUNTER — HOSPITAL ENCOUNTER (OUTPATIENT)
Dept: RADIOLOGY | Age: 39
Discharge: HOME | End: 2024-05-07
Payer: COMMERCIAL

## 2024-05-07 DIAGNOSIS — Z12.31 VISIT FOR SCREENING MAMMOGRAM: ICD-10-CM

## 2024-05-07 PROCEDURE — 77067 SCR MAMMO BI INCL CAD: CPT

## 2024-05-07 PROCEDURE — 77063 BREAST TOMOSYNTHESIS BI: CPT

## 2025-05-13 ENCOUNTER — HOSPITAL ENCOUNTER (OUTPATIENT)
Dept: RADIOLOGY | Age: 40
Discharge: HOME | End: 2025-05-13
Payer: COMMERCIAL

## 2025-05-13 VITALS — BODY MASS INDEX: 37.56 KG/M2 | WEIGHT: 220 LBS | HEIGHT: 64 IN

## 2025-05-13 DIAGNOSIS — Z12.31 VISIT FOR SCREENING MAMMOGRAM: ICD-10-CM

## 2025-05-13 PROCEDURE — 77067 SCR MAMMO BI INCL CAD: CPT

## 2025-05-13 PROCEDURE — 77063 BREAST TOMOSYNTHESIS BI: CPT

## 2025-06-02 ENCOUNTER — OFFICE VISIT (OUTPATIENT)
Dept: FAMILY MEDICINE CLINIC | Facility: CLINIC | Age: 40
End: 2025-06-02
Payer: COMMERCIAL

## 2025-06-02 VITALS
BODY MASS INDEX: 38.07 KG/M2 | WEIGHT: 223 LBS | HEART RATE: 84 BPM | TEMPERATURE: 97 F | DIASTOLIC BLOOD PRESSURE: 62 MMHG | OXYGEN SATURATION: 97 % | SYSTOLIC BLOOD PRESSURE: 142 MMHG | HEIGHT: 64 IN

## 2025-06-02 DIAGNOSIS — E66.812 CLASS 2 OBESITY DUE TO EXCESS CALORIES WITHOUT SERIOUS COMORBIDITY WITH BODY MASS INDEX (BMI) OF 38.0 TO 38.9 IN ADULT: ICD-10-CM

## 2025-06-02 DIAGNOSIS — E66.09 CLASS 2 OBESITY DUE TO EXCESS CALORIES WITHOUT SERIOUS COMORBIDITY WITH BODY MASS INDEX (BMI) OF 38.0 TO 38.9 IN ADULT: ICD-10-CM

## 2025-06-02 DIAGNOSIS — Z00.00 ANNUAL PHYSICAL EXAM: Primary | ICD-10-CM

## 2025-06-02 DIAGNOSIS — R03.0 ELEVATED BLOOD PRESSURE READING: ICD-10-CM

## 2025-06-02 DIAGNOSIS — Z13.6 SCREENING FOR CARDIOVASCULAR CONDITION: ICD-10-CM

## 2025-06-02 DIAGNOSIS — Z83.3 FAMILY HISTORY OF DIABETES MELLITUS: ICD-10-CM

## 2025-06-02 PROBLEM — R51.9 ACUTE INTRACTABLE HEADACHE: Status: RESOLVED | Noted: 2023-11-28 | Resolved: 2025-06-02

## 2025-06-02 PROCEDURE — 99214 OFFICE O/P EST MOD 30 MIN: CPT

## 2025-06-02 PROCEDURE — 99396 PREV VISIT EST AGE 40-64: CPT

## 2025-06-02 RX ORDER — BLOOD PRESSURE TEST KIT
KIT MISCELLANEOUS DAILY
Qty: 1 KIT | Refills: 0 | Status: SHIPPED | OUTPATIENT
Start: 2025-06-02

## 2025-06-02 NOTE — PROGRESS NOTES
Adult Annual Physical  Name: Gin Pastrana      : 1985      MRN: [id number]  Encounter Provider: Luz Guzman DO  Encounter Date: 2025   Encounter department: [facility]    :  Assessment & Plan  Annual physical exam  - screening for cardiovascular disease: basic labs ordered today  - screening for breast cancer: mammogram 2025, f/u 1 year  - screening for cervical cancer: had total hysterectomy 2024, follows GYN  - screening for colon cancer: due age 43  - immunizations: recommend tdap, declines today  - counseled pt on lifestyle modifications such as diet changes and 150 mins/weekly of moderate intensity exercise          Screening for cardiovascular condition    Orders:    Lipid panel; Future    Basic metabolic panel; Future    Elevated blood pressure reading  /62 initially, still about same on manual recheck. Also noted in the past. Had headaches in the past, got glasses only for distances so does not wear daily, which helped a little. Reports glasses may need adjustment but denies any further episodes of headaches. No CP, palpitations.     Plan:  Keep BP log 3-4 x weekly   Goal BP at least < 140/90  Recommend low-salt diet  F/u 3-4 weeks after labs and with log  Orders:    Blood Pressure KIT; Use in the morning    Family history of diabetes mellitus    Orders:    Hemoglobin A1C; Future    Class 2 obesity due to excess calories without serious comorbidity with body mass index (BMI) of 38.0 to 38.9 in adult  Encourage lifestyle modification with diet and exercise.           Preventive Screenings:  - Diabetes Screening: orders placed  - Cholesterol Screening: orders placed   - Cervical cancer screening: screening up-to-date   - Breast cancer screening: screening up-to-date   - Lung cancer screening: screening not indicated     Immunizations:  - Immunizations due: Tdap    Counseling/Anticipatory Guidance:    - Dental health: discussed importance of regular tooth  brushing, flossing, and dental visits.   - Sexual health: discussed sexually transmitted diseases, partner selection, use of condoms, avoidance of unintended pregnancy, and contraceptive alternatives.   - Diet: discussed recommendations for a healthy/well-balanced diet.   - Exercise: the importance of regular exercise/physical activity was discussed. Recommend exercise 3-5 times per week for at least 30 minutes.   - Injury prevention: discussed safety/seat belts, safety helmets, smoke detectors, carbon monoxide detectors, and smoking near bedding or upholstery.          History of Present Illness     Adult Annual Physical:  Patient presents for annual physical. Moving to a new house - 3 flights of stairs. .     Diet and Physical Activity:  - Diet/Nutrition: no special diet. drink ginger ale/water/green tea  - Exercise: no formal exercise.    Depression Screening:  - PHQ-2 Score: 0    General Health:  - Sleep: sleeps well.  - Hearing: normal hearing bilateral ears.  - Vision: wears glasses.  - Dental: brushes teeth twice daily and floss regularly.    /GYN Health:  - Follows with GYN: yes.   - History of STDs: no  - Contraception: hysterectomy. total hysterectomy 1/2024      Advanced Care Planning:  - Has an advanced directive?: no    - Has a durable medical POA?: yes      Review of Systems   Constitutional:  Negative for chills and fever.   HENT:  Negative for congestion and rhinorrhea.    Eyes:  Negative for visual disturbance.   Respiratory:  Negative for cough and shortness of breath.    Cardiovascular:  Negative for chest pain and palpitations.   Gastrointestinal:  Negative for abdominal pain, constipation, diarrhea, nausea and vomiting.   Genitourinary:  Negative for dysuria.   Musculoskeletal:  Negative for arthralgias.   Skin:  Negative for rash.   Neurological:  Negative for dizziness, light-headedness and headaches.     Medications Ordered Prior to Encounter[1]     Objective   /62 (BP Location: Left  "arm, Patient Position: Sitting, Cuff Size: Large)   Pulse 84   Temp (!) 97 °F (36.1 °C) (Tympanic)   Ht 5' 4\" (1.626 m)   Wt 101 kg (223 lb)   LMP 11/21/2023 (Approximate) Comment: LMP 11/21/23  per pt  SpO2 97%   BMI 38.28 kg/m²     Physical Exam  Vitals reviewed.   Constitutional:       Appearance: She is obese.   HENT:      Head: Normocephalic and atraumatic.      Right Ear: External ear normal.      Left Ear: External ear normal.      Nose: Nose normal.      Mouth/Throat:      Pharynx: Oropharynx is clear.     Eyes:      Extraocular Movements: Extraocular movements intact.      Conjunctiva/sclera: Conjunctivae normal.       Cardiovascular:      Rate and Rhythm: Normal rate and regular rhythm.      Pulses: Normal pulses.      Heart sounds: Normal heart sounds.   Pulmonary:      Effort: Pulmonary effort is normal.      Breath sounds: Normal breath sounds.   Abdominal:      Palpations: Abdomen is soft.     Musculoskeletal:      Cervical back: Neck supple.      Right lower leg: No edema.      Left lower leg: No edema.     Skin:     General: Skin is warm.     Neurological:      Mental Status: She is alert and oriented to person, place, and time.     Psychiatric:         Mood and Affect: Mood normal.         Behavior: Behavior normal.         Thought Content: Thought content normal.         Judgment: Judgment normal.                [1]   Current Outpatient Medications on File Prior to Visit   Medication Sig Dispense Refill    ibuprofen (MOTRIN) 800 mg tablet Take 1 tablet (800 mg total) by mouth every 8 (eight) hours as needed for mild pain 30 tablet 0    [DISCONTINUED] acetaminophen (TYLENOL) 325 mg tablet Take 3 tablets (975 mg total) by mouth every 6 (six) hours as needed for mild pain (Patient not taking: Reported on 12/26/2023)  0    [DISCONTINUED] fluconazole (DIFLUCAN) 150 mg tablet  (Patient not taking: Reported on 3/4/2024)      [DISCONTINUED] nitrofurantoin (MACROBID) 100 mg capsule Take 1 capsule " (100 mg total) by mouth 2 (two) times a day 14 capsule 0    [DISCONTINUED] oxyCODONE-acetaminophen (Percocet) 5-325 mg per tablet Take 1 tablet by mouth every 4 (four) hours as needed for severe pain for up to 15 doses Max Daily Amount: 6 tablets (Patient not taking: Reported on 12/26/2023) 15 tablet 0    [DISCONTINUED] Riboflavin 400 MG TABS Take 1 tablet (400 mg total) by mouth in the morning 90 tablet 0     No current facility-administered medications on file prior to visit.

## 2025-06-02 NOTE — ASSESSMENT & PLAN NOTE
/62 initially, still about same on manual recheck. Also noted in the past. Had headaches in the past, got glasses only for distances so does not wear daily, which helped a little. Reports glasses may need adjustment but denies any further episodes of headaches. No CP, palpitations.     Plan:  Keep BP log 3-4 x weekly   Goal BP at least < 140/90  Recommend low-salt diet  F/u 3-4 weeks after labs and with log  Orders:    Blood Pressure KIT; Use in the morning

## (undated) DEVICE — SYRINGE 50ML LL

## (undated) DEVICE — SUT VICRYL 0 UR-6 27 IN J603H

## (undated) DEVICE — STRL COTTON TIP APPLCTR 6IN PK: Brand: CARDINAL HEALTH

## (undated) DEVICE — PLASTIC ADHESIVE BANDAGE: Brand: CURITY

## (undated) DEVICE — SCD SEQUENTIAL COMPRESSION COMFORT SLEEVE MEDIUM KNEE LENGTH: Brand: KENDALL SCD

## (undated) DEVICE — LAPAROSCOPIC SMOKE EVAC TUBING

## (undated) DEVICE — SUT PLAIN 3-0 PS-1 27 IN 1640H

## (undated) DEVICE — PREMIUM DRY TRAY LF: Brand: MEDLINE INDUSTRIES, INC.

## (undated) DEVICE — IV SET EXTEN MICROBORE 30 IN LIFESHIELD

## (undated) DEVICE — BETHLEHEM UNIVERSAL GYN LAP PK: Brand: CARDINAL HEALTH

## (undated) DEVICE — TROCAR: Brand: KII SLEEVE

## (undated) DEVICE — ELECTRODE LAP J HOOK E-Z CLEAN 33CM-0021

## (undated) DEVICE — CHLORAPREP HI-LITE 26ML ORANGE

## (undated) DEVICE — TRAY FOLEY 16FR URIMETER SILICONE SURESTEP

## (undated) DEVICE — METZENBAUM ADTEC SINGLE USE DISSECTING SCISSORS, SHAFT ONLY, MONOPOLAR, CURVED TO LEFT, WORKING LENGTH: 12 1/4", (310 MM), DIAM. 5 MM, INSULATED, DOUBLE ACTION, STERILE, DISPOSABLE, PACKAGE OF 10 PIECES: Brand: AESCULAP

## (undated) DEVICE — INTENDED FOR TISSUE SEPARATION, AND OTHER PROCEDURES THAT REQUIRE A SHARP SURGICAL BLADE TO PUNCTURE OR CUT.: Brand: BARD-PARKER SAFETY BLADES SIZE 11, STERILE

## (undated) DEVICE — DRAPE EQUIPMENT RF WAND

## (undated) DEVICE — LIGASURE LAP SLR/DIV MARYLAND 5MM

## (undated) DEVICE — MAYO STAND COVER: Brand: CONVERTORS

## (undated) DEVICE — 3000CC GUARDIAN II: Brand: GUARDIAN

## (undated) DEVICE — [HIGH FLOW INSUFFLATOR,  DO NOT USE IF PACKAGE IS DAMAGED,  KEEP DRY,  KEEP AWAY FROM SUNLIGHT,  PROTECT FROM HEAT AND RADIOACTIVE SOURCES.]: Brand: PNEUMOSURE

## (undated) DEVICE — INSUFFLATION NEEDLE TO ESTABLISH PNEUMOPERITONEUM.: Brand: INSUFFLATION NEEDLE

## (undated) DEVICE — GLOVE PI ULTRA TOUCH SZ.7.5

## (undated) DEVICE — IRRIG ENDO FLO TUBING

## (undated) DEVICE — Device

## (undated) DEVICE — PENCIL ELECTROSURG E-Z CLEAN -0035H

## (undated) DEVICE — KIT INCLUDES:GTB14, ALEXIS CONTAINED EXT SYS 5BXCNGL3, GELPOINT MINI ADV ACCESS PLATFORM: Brand: ALEXIS CONTAINED EXTRACTION SYSTEM WITH GELPOINT MINI ADVANCED ACCESS PLATFORM

## (undated) DEVICE — TROCAR: Brand: KII FIOS FIRST ENTRY

## (undated) DEVICE — UTERINE MANIPULATOR RUMI DISP TIP 6.7X12CM ORANGE

## (undated) DEVICE — BLUE HEAT SCOPE WARMER

## (undated) DEVICE — 40595 XL TRENDELENBURG POSITIONING KIT: Brand: 40595 XL TRENDELENBURG POSITIONING KIT